# Patient Record
Sex: MALE | Race: OTHER | Employment: FULL TIME | URBAN - METROPOLITAN AREA
[De-identification: names, ages, dates, MRNs, and addresses within clinical notes are randomized per-mention and may not be internally consistent; named-entity substitution may affect disease eponyms.]

---

## 2017-01-24 ENCOUNTER — PATIENT OUTREACH (OUTPATIENT)
Dept: FAMILY MEDICINE CLINIC | Facility: CLINIC | Age: 57
End: 2017-01-24

## 2017-04-07 ENCOUNTER — OFFICE VISIT (OUTPATIENT)
Dept: FAMILY MEDICINE CLINIC | Facility: CLINIC | Age: 57
End: 2017-04-07

## 2017-04-07 VITALS
RESPIRATION RATE: 18 BRPM | TEMPERATURE: 102 F | HEART RATE: 105 BPM | HEIGHT: 72 IN | WEIGHT: 223 LBS | OXYGEN SATURATION: 96 % | DIASTOLIC BLOOD PRESSURE: 76 MMHG | BODY MASS INDEX: 30.2 KG/M2 | SYSTOLIC BLOOD PRESSURE: 118 MMHG

## 2017-04-07 DIAGNOSIS — J11.1 FLU: Primary | ICD-10-CM

## 2017-04-07 PROCEDURE — 99214 OFFICE O/P EST MOD 30 MIN: CPT | Performed by: FAMILY MEDICINE

## 2017-04-07 PROCEDURE — 87502 INFLUENZA DNA AMP PROBE: CPT | Performed by: FAMILY MEDICINE

## 2017-04-07 PROCEDURE — 87798 DETECT AGENT NOS DNA AMP: CPT | Performed by: FAMILY MEDICINE

## 2017-04-07 RX ORDER — OSELTAMIVIR PHOSPHATE 75 MG/1
75 CAPSULE ORAL 2 TIMES DAILY
Qty: 10 CAPSULE | Refills: 0 | Status: SHIPPED | OUTPATIENT
Start: 2017-04-07 | End: 2017-08-03 | Stop reason: ALTCHOICE

## 2017-04-07 RX ORDER — CODEINE PHOSPHATE AND GUAIFENESIN 10; 100 MG/5ML; MG/5ML
5 SOLUTION ORAL 3 TIMES DAILY PRN
Qty: 120 ML | Refills: 0 | Status: SHIPPED
Start: 2017-04-07 | End: 2017-04-17

## 2017-04-07 NOTE — PROGRESS NOTES
Patient presents with:  Cough: RM 16, aches, fever, x 3 days      HPI:   Roseanna Moreno is a 55year old male who presents for upper respiratory symptoms for  2  days. Started suddenly. Moderate, worsening, any activities makes it worse. OTC is not work pains, palpitations. LUNGS: denies shortness of breath with exertion or rest.  CARDIOVASCULAR: denies chest pain on exertion  GI: no nausea or abdominal pain, diarrhea. URO: no decreased urination.       EXAM:   /76 mmHg  Pulse 105  Temp(Src) 102 °F

## 2017-04-10 ENCOUNTER — TELEPHONE (OUTPATIENT)
Dept: FAMILY MEDICINE CLINIC | Facility: CLINIC | Age: 57
End: 2017-04-10

## 2017-04-10 NOTE — TELEPHONE ENCOUNTER
----- Message from Linsey Nava MD sent at 4/8/2017 11:28 AM CDT -----  On call MD notified by lab  Attempted to call patient. No answer.  Did not leave VM because of unidentifiable voice mail  Pls shruti pt and inform of + Influenza B  Pt Rx'd Tamiflu on

## 2017-04-10 NOTE — TELEPHONE ENCOUNTER
I called pt at [phone] and verified 2 patient identifiers: name & . I discussed results and recommendations at length with patient. Pt is taking the Tamiflu. All questions answered.

## 2017-05-30 ENCOUNTER — PATIENT MESSAGE (OUTPATIENT)
Dept: FAMILY MEDICINE CLINIC | Facility: CLINIC | Age: 57
End: 2017-05-30

## 2017-05-30 DIAGNOSIS — L70.9 ACNE, UNSPECIFIED ACNE TYPE: Primary | ICD-10-CM

## 2017-05-30 DIAGNOSIS — L70.9 ACNE, UNSPECIFIED ACNE TYPE: ICD-10-CM

## 2017-05-30 NOTE — TELEPHONE ENCOUNTER
From: Laure Pyle.   To: Andrea Norwood MD  Sent: 5/30/2017 9:55 AM CDT  Subject: Medication Renewal Request    Original authorizing provider: MD Laure Rogers. would like a refill of the following medications:  hydrocor

## 2017-05-30 NOTE — TELEPHONE ENCOUNTER
From: Catrachito Lyn. To: Ander Schultz MD  Sent: 5/30/2017 9:59 AM CDT  Subject: Prescription Question    i am currently using hydrocortisone 2.5% cream for exzyma on my back as well as other areas.  When I have requested a refill of this prescri

## 2017-07-28 ENCOUNTER — HOSPITAL ENCOUNTER (OUTPATIENT)
Dept: CT IMAGING | Facility: HOSPITAL | Age: 57
Discharge: HOME | End: 2017-07-28
Attending: INTERNAL MEDICINE

## 2017-07-28 DIAGNOSIS — Z13.9 VISIT FOR SCREENING: ICD-10-CM

## 2017-08-03 ENCOUNTER — LABORATORY ENCOUNTER (OUTPATIENT)
Dept: LAB | Age: 57
End: 2017-08-03
Attending: EMERGENCY MEDICINE
Payer: COMMERCIAL

## 2017-08-03 ENCOUNTER — OFFICE VISIT (OUTPATIENT)
Dept: FAMILY MEDICINE CLINIC | Facility: CLINIC | Age: 57
End: 2017-08-03

## 2017-08-03 VITALS
HEIGHT: 71 IN | SYSTOLIC BLOOD PRESSURE: 122 MMHG | OXYGEN SATURATION: 98 % | RESPIRATION RATE: 16 BRPM | HEART RATE: 68 BPM | BODY MASS INDEX: 31.08 KG/M2 | WEIGHT: 222 LBS | TEMPERATURE: 98 F | DIASTOLIC BLOOD PRESSURE: 88 MMHG

## 2017-08-03 DIAGNOSIS — Z13.21 ENCOUNTER FOR VITAMIN DEFICIENCY SCREENING: ICD-10-CM

## 2017-08-03 DIAGNOSIS — Z12.5 PROSTATE CANCER SCREENING: ICD-10-CM

## 2017-08-03 DIAGNOSIS — Z13.228 SCREENING FOR ENDOCRINE, NUTRITIONAL, METABOLIC AND IMMUNITY DISORDER: ICD-10-CM

## 2017-08-03 DIAGNOSIS — Z13.29 SCREENING FOR ENDOCRINE, NUTRITIONAL, METABOLIC AND IMMUNITY DISORDER: ICD-10-CM

## 2017-08-03 DIAGNOSIS — Z13.21 SCREENING FOR ENDOCRINE, NUTRITIONAL, METABOLIC AND IMMUNITY DISORDER: ICD-10-CM

## 2017-08-03 DIAGNOSIS — L30.9 ECZEMA, UNSPECIFIED TYPE: ICD-10-CM

## 2017-08-03 DIAGNOSIS — E11.9 TYPE 2 DIABETES MELLITUS WITHOUT COMPLICATION, WITHOUT LONG-TERM CURRENT USE OF INSULIN (HCC): ICD-10-CM

## 2017-08-03 DIAGNOSIS — R91.1 PULMONARY NODULE: ICD-10-CM

## 2017-08-03 DIAGNOSIS — Z13.0 SCREENING FOR ENDOCRINE, NUTRITIONAL, METABOLIC AND IMMUNITY DISORDER: ICD-10-CM

## 2017-08-03 DIAGNOSIS — N52.9 ERECTILE DYSFUNCTION, UNSPECIFIED ERECTILE DYSFUNCTION TYPE: ICD-10-CM

## 2017-08-03 DIAGNOSIS — Z12.5 SCREENING FOR PROSTATE CANCER: ICD-10-CM

## 2017-08-03 DIAGNOSIS — Z00.00 ENCOUNTER FOR ANNUAL PHYSICAL EXAM: Primary | ICD-10-CM

## 2017-08-03 LAB
25-HYDROXYVITAMIN D (TOTAL): 34.3 NG/ML (ref 30–100)
ALBUMIN SERPL-MCNC: 4 G/DL (ref 3.5–4.8)
ALP LIVER SERPL-CCNC: 130 U/L (ref 45–117)
ALT SERPL-CCNC: 43 U/L (ref 17–63)
AST SERPL-CCNC: 33 U/L (ref 15–41)
BASOPHILS # BLD AUTO: 0.04 X10(3) UL (ref 0–0.1)
BASOPHILS NFR BLD AUTO: 0.6 %
BILIRUB SERPL-MCNC: 0.5 MG/DL (ref 0.1–2)
BUN BLD-MCNC: 10 MG/DL (ref 8–20)
CALCIUM BLD-MCNC: 9 MG/DL (ref 8.3–10.3)
CHLORIDE: 106 MMOL/L (ref 101–111)
CHOLEST SMN-MCNC: 190 MG/DL (ref ?–200)
CO2: 28 MMOL/L (ref 22–32)
COMPLEXED PSA SERPL-MCNC: 0.35 NG/ML (ref 0.01–4)
CREAT BLD-MCNC: 0.89 MG/DL (ref 0.7–1.3)
CREAT UR-SCNC: 99 MG/DL
EOSINOPHIL # BLD AUTO: 0.53 X10(3) UL (ref 0–0.3)
EOSINOPHIL NFR BLD AUTO: 8.2 %
ERYTHROCYTE [DISTWIDTH] IN BLOOD BY AUTOMATED COUNT: 13.7 % (ref 11.5–16)
EST. AVERAGE GLUCOSE BLD GHB EST-MCNC: 117 MG/DL (ref 68–126)
GLUCOSE BLD-MCNC: 123 MG/DL (ref 70–99)
HBA1C MFR BLD HPLC: 5.7 % (ref ?–5.7)
HCT VFR BLD AUTO: 45 % (ref 37–53)
HDLC SERPL-MCNC: 44 MG/DL (ref 45–?)
HDLC SERPL: 4.32 {RATIO} (ref ?–4.97)
HGB BLD-MCNC: 15.6 G/DL (ref 13–17)
IMMATURE GRANULOCYTE COUNT: 0.02 X10(3) UL (ref 0–1)
IMMATURE GRANULOCYTE RATIO %: 0.3 %
LDLC SERPL CALC-MCNC: 121 MG/DL (ref ?–130)
LDLC SERPL-MCNC: 25 MG/DL (ref 5–40)
LYMPHOCYTES # BLD AUTO: 2.26 X10(3) UL (ref 0.9–4)
LYMPHOCYTES NFR BLD AUTO: 35.1 %
M PROTEIN MFR SERPL ELPH: 7.8 G/DL (ref 6.1–8.3)
MCH RBC QN AUTO: 33 PG (ref 27–33.2)
MCHC RBC AUTO-ENTMCNC: 34.7 G/DL (ref 31–37)
MCV RBC AUTO: 95.1 FL (ref 80–99)
MICROALBUMIN UR-MCNC: <0.5 MG/DL
MONOCYTES # BLD AUTO: 0.51 X10(3) UL (ref 0.1–0.6)
MONOCYTES NFR BLD AUTO: 7.9 %
NEUTROPHIL ABS PRELIM: 3.08 X10 (3) UL (ref 1.3–6.7)
NEUTROPHILS # BLD AUTO: 3.08 X10(3) UL (ref 1.3–6.7)
NEUTROPHILS NFR BLD AUTO: 47.9 %
NONHDLC SERPL-MCNC: 146 MG/DL (ref ?–130)
PLATELET # BLD AUTO: 208 10(3)UL (ref 150–450)
POTASSIUM SERPL-SCNC: 4.8 MMOL/L (ref 3.6–5.1)
RBC # BLD AUTO: 4.73 X10(6)UL (ref 4.3–5.7)
RED CELL DISTRIBUTION WIDTH-SD: 47.6 FL (ref 35.1–46.3)
SODIUM SERPL-SCNC: 140 MMOL/L (ref 136–144)
TRIGLYCERIDES: 124 MG/DL (ref ?–150)
TSI SER-ACNC: 2.24 MIU/ML (ref 0.35–5.5)
WBC # BLD AUTO: 6.4 X10(3) UL (ref 4–13)

## 2017-08-03 PROCEDURE — 80061 LIPID PANEL: CPT | Performed by: EMERGENCY MEDICINE

## 2017-08-03 PROCEDURE — 84153 ASSAY OF PSA TOTAL: CPT | Performed by: EMERGENCY MEDICINE

## 2017-08-03 PROCEDURE — 82570 ASSAY OF URINE CREATININE: CPT | Performed by: EMERGENCY MEDICINE

## 2017-08-03 PROCEDURE — 83036 HEMOGLOBIN GLYCOSYLATED A1C: CPT | Performed by: EMERGENCY MEDICINE

## 2017-08-03 PROCEDURE — 82306 VITAMIN D 25 HYDROXY: CPT | Performed by: EMERGENCY MEDICINE

## 2017-08-03 PROCEDURE — 36415 COLL VENOUS BLD VENIPUNCTURE: CPT | Performed by: EMERGENCY MEDICINE

## 2017-08-03 PROCEDURE — 99213 OFFICE O/P EST LOW 20 MIN: CPT | Performed by: EMERGENCY MEDICINE

## 2017-08-03 PROCEDURE — 99396 PREV VISIT EST AGE 40-64: CPT | Performed by: EMERGENCY MEDICINE

## 2017-08-03 PROCEDURE — 82043 UR ALBUMIN QUANTITATIVE: CPT | Performed by: EMERGENCY MEDICINE

## 2017-08-03 PROCEDURE — 80050 GENERAL HEALTH PANEL: CPT | Performed by: EMERGENCY MEDICINE

## 2017-08-03 RX ORDER — SILDENAFIL 50 MG/1
TABLET, FILM COATED ORAL
Qty: 20 TABLET | Refills: 0 | Status: SHIPPED | OUTPATIENT
Start: 2017-08-03 | End: 2017-10-13 | Stop reason: ALTCHOICE

## 2017-08-03 NOTE — PATIENT INSTRUCTIONS
Thank you for choosing [facility]  To Do:  FOR ALISTAIR Nieto.  1. Have blood tests done today  2. Follow up in 6 months for recheck DM  3. Arrange for diabetic eye exam with opthalmologist, Dr. Catherine Shelton  4.  Continue with all other meds sandwich  Maldives  · Chicken enchilada, without cheese or sour cream   · Small burrito with whole beans and chicken  · Whole beans (not refried) and rice  · Chicken or fish [facility]  Steakhouse  · Grilled or broiled lean cuts of beef  · Baked potato with ve yogurt  ¨ Meat, poultry, fish, or tofu  ¨ Healthy fats  · Check your blood sugar levels as directed by your provider. Take any medicine as prescribed by your provider. · Learn to read food labels and pick the right portion sizes.   · Eat only the amount of hormone in your body. It lets blood sugar (glucose) reach the cells in your body. All of your cells need glucose for fuel. When you have diabetes, the glucose in your blood builds up because it cannot get into the cells.  This buildup is called high blood lost feeling in your feet, you may not see an injury or infection. Check your feet and between your toes at least once a week. · Wear a medical alert bracelet or necklace, or carry a card in your wallet that says you have diabetes.  This will help [facility] are awake. General care  Always bring a source of fast-acting sugar with you in case you have symptoms of low blood sugar (below 70). At the first sign of low blood sugar, eat or drink 15 to 20 grams of fast-acting sugar to raise your blood sugar.  Example reserved. This information is not intended as a substitute for professional medical care. Always follow your healthcare professional's instructions.

## 2017-08-03 NOTE — PROGRESS NOTES
Chief Complaint:   Patient presents with:  Physical: physical and labs. HPI:   This is a 55year old male who present for a yearly annual exam    WELL-MALE EXAM     1. Age:   55   2. Have you had any of the following problems:         a.  High blood steady your          nerves or get rid of a hangover? NO     7. Prevention:        b.  Exercise:  A lot of walking            Activity:             Days per week:             Time/duration ________ minutes             Exertion:       stroll      mild      h Comment: 6 beer per wk    Family History:  Family History   Problem Relation Age of Onset   • Diabetes Mother        Allergies   No Known Allergies      Current Outpatient Prescriptions on File Prior to Visit:  Fluticasone Propionate 50 MCG/ACT Nasal [place]pe no guarding rigidity no rebound tenderness    ASSESSMENT AND PLAN:         Encounter for annual physical exam  Well balanced diet recommended.    Routine exercise recommended most days during the week.   Wear sunscreen - SPF 15 or higher and reapply every 2 Future

## 2017-08-08 ENCOUNTER — TELEPHONE (OUTPATIENT)
Dept: FAMILY MEDICINE CLINIC | Facility: CLINIC | Age: 57
End: 2017-08-08

## 2017-08-08 DIAGNOSIS — E11.9 TYPE 2 DIABETES MELLITUS WITHOUT COMPLICATION, WITHOUT LONG-TERM CURRENT USE OF INSULIN (HCC): ICD-10-CM

## 2017-08-08 DIAGNOSIS — E78.00 HYPERCHOLESTEREMIA: Primary | ICD-10-CM

## 2017-08-08 RX ORDER — ATORVASTATIN CALCIUM 10 MG/1
10 TABLET, FILM COATED ORAL DAILY
Qty: 90 TABLET | Refills: 0 | Status: SHIPPED | OUTPATIENT
Start: 2017-08-08 | End: 2018-04-09

## 2017-08-08 NOTE — TELEPHONE ENCOUNTER
Atorvastatin to be restarted per dr. Solo Ordonez. Protocol passed, refill approved. Notes Recorded by Corine Franco MD on 8/8/2017 at 6:51 AM CDT  All labs stable.   Continue with Metformin  Pls remind patient to have DM eye exam  Pt needs to be on

## 2017-08-08 NOTE — TELEPHONE ENCOUNTER
Reviewed labs with patient, patient to restart atorvastatin 10 mg daily. Sent rx to pharmacy per protocol. Patient verbalized understanding of labs, will schedule eye exam. Patient has PET scan scheduled for Tuesday-FYI.      Future Appointments  Date Time

## 2017-08-15 ENCOUNTER — HOSPITAL ENCOUNTER (OUTPATIENT)
Dept: NUCLEAR MEDICINE | Facility: HOSPITAL | Age: 57
Discharge: HOME | End: 2017-08-15
Attending: EMERGENCY MEDICINE
Payer: COMMERCIAL

## 2017-08-15 DIAGNOSIS — R91.1 PULMONARY NODULE: ICD-10-CM

## 2017-08-15 LAB — GLUCOSE BLD-MCNC: 126 MG/DL (ref 65–99)

## 2017-08-15 PROCEDURE — 82962 GLUCOSE BLOOD TEST: CPT

## 2017-08-15 PROCEDURE — 78815 PET IMAGE W/CT SKULL-THIGH: CPT | Performed by: EMERGENCY MEDICINE

## 2017-08-17 ENCOUNTER — PATIENT MESSAGE (OUTPATIENT)
Dept: FAMILY MEDICINE CLINIC | Facility: CLINIC | Age: 57
End: 2017-08-17

## 2017-08-17 ENCOUNTER — TELEPHONE (OUTPATIENT)
Dept: FAMILY MEDICINE CLINIC | Facility: CLINIC | Age: 57
End: 2017-08-17

## 2017-08-17 DIAGNOSIS — R91.1 LUNG NODULE: Primary | ICD-10-CM

## 2017-08-18 ENCOUNTER — PATIENT MESSAGE (OUTPATIENT)
Dept: FAMILY MEDICINE CLINIC | Facility: CLINIC | Age: 57
End: 2017-08-18

## 2017-08-18 NOTE — TELEPHONE ENCOUNTER
From: Chary Shaver. To: Mone Coffey MD  Sent: 8/18/2017  9:59 AM CDT  Subject: Test Results Question    my main question, is so what is the nodule? My sister, who is an RN has asked me to ask you about CT guided bioposy. ..      test results f

## 2017-08-18 NOTE — TELEPHONE ENCOUNTER
From: Chary Shaver. To: Mone Coffey MD  Sent: 8/17/2017 2:23 PM CDT  Subject: Test Results Question    Have you gotten the results from my PET CT scan I had earlier this week?

## 2017-08-18 NOTE — TELEPHONE ENCOUNTER
Pt has the below 2 questions after receiving these results notes:     Notes Recorded by Carlo Perry MD on 8/18/2017 at 9:15 AM CDT  No evidence for malignancy based of PET scan  Recommend repeat CT chest in 6 months

## 2017-08-18 NOTE — TELEPHONE ENCOUNTER
Pt sent a Gyst message requesting results as well. Gyst message sent. Released results threw [address] Imaging ordered.

## 2017-08-18 NOTE — TELEPHONE ENCOUNTER
From: Teddy Nava. To: Bryan Duarte MD  Sent: 8/18/2017 9:50 AM CDT  Subject: Test Results Question    test results from PET CT scan on 08.15.17 also state \"enlarged prostrate. [place] Knock [place] Knock \"

## 2017-08-21 ENCOUNTER — PATIENT MESSAGE (OUTPATIENT)
Dept: FAMILY MEDICINE CLINIC | Facility: CLINIC | Age: 57
End: 2017-08-21

## 2017-08-21 ENCOUNTER — TELEPHONE (OUTPATIENT)
Dept: FAMILY MEDICINE CLINIC | Facility: CLINIC | Age: 57
End: 2017-08-21

## 2017-08-21 NOTE — TELEPHONE ENCOUNTER
Preet Jefferson from referral left message for patient stating she was calling to schedule  CT, pt wants to know if Dr Adelbert Skiff ordered this CT, pt unclear if this is correct as he has already had 2 CTs and was unaware that a 3rd was being ordered, please f/u with

## 2017-08-21 NOTE — TELEPHONE ENCOUNTER
From: Keyon Springer.   To: Hector Black MD  Sent: 8/21/2017 2:50 PM CDT  Subject: Visit Follow-up Question    on Friday 08.18 I received a voice message from [facility] [place] from the referral department on behalf of Dr. Sonal Bernabe me of an order f

## 2017-08-25 NOTE — TELEPHONE ENCOUNTER
Pulmonary nodule just needs monitoring for now. Repeat CT in 6 months. No biopsy unless it changes or increases in size    Prostate is enlarged as this happens a lot as men get older. PSA is normal. Will also need monitoring.  Will prostate exam on next phs

## 2017-08-25 NOTE — TELEPHONE ENCOUNTER
Patient notified of below. Advised to call if any questions or concerns. Repeat CT chest is already in system.

## 2017-10-13 ENCOUNTER — OFFICE VISIT (OUTPATIENT)
Dept: FAMILY MEDICINE CLINIC | Facility: CLINIC | Age: 57
End: 2017-10-13

## 2017-10-13 VITALS
DIASTOLIC BLOOD PRESSURE: 80 MMHG | OXYGEN SATURATION: 98 % | SYSTOLIC BLOOD PRESSURE: 118 MMHG | TEMPERATURE: 99 F | WEIGHT: 229 LBS | RESPIRATION RATE: 16 BRPM | HEART RATE: 72 BPM | BODY MASS INDEX: 32 KG/M2

## 2017-10-13 DIAGNOSIS — J02.9 SORE THROAT: Primary | ICD-10-CM

## 2017-10-13 DIAGNOSIS — J06.9 VIRAL URI WITH COUGH: ICD-10-CM

## 2017-10-13 PROCEDURE — 87880 STREP A ASSAY W/OPTIC: CPT | Performed by: NURSE PRACTITIONER

## 2017-10-13 PROCEDURE — 99213 OFFICE O/P EST LOW 20 MIN: CPT | Performed by: NURSE PRACTITIONER

## 2017-10-13 NOTE — PROGRESS NOTES
HPI:   Guadalupe Javier. is a 65year old male who presents with ill symptoms for  2  days.  Patient reports sore throat, congestion, low grade fever, cough with clear/yellow colored sputum, cough is not keeping pt up at night, took Theraflu this morning HEENT: atraumatic, normocephalic,ears full and clear, nares with minimal mucus, throat with no erythema or edema, clear PND noted. No sinus tenderness with palpation.   NECK: supple,tonsillar adenopathy  LUNGS: clear to auscultation all lobes, no wheezes or · Stay away from cigarette smoke - both your smoke and the smoke from others. · You may use over-the-counter acetaminophen or ibuprofen for fever, muscle aching, and headache, unless another medicine was prescribed for this.  If you have chronic liver or k · Continued vomiting (can’t keep liquids down)  · Frequent diarrhea (more than 5 times a day); blood (red or black color) or mucus in diarrhea  · Feeling weak, dizzy, or like you are going to faint  · Extreme thirst  · Fever of 100.4°F (38°C) or higher, or

## 2018-02-05 ENCOUNTER — HOSPITAL ENCOUNTER (OUTPATIENT)
Dept: CT IMAGING | Facility: HOSPITAL | Age: 58
Discharge: HOME | End: 2018-02-05
Attending: EMERGENCY MEDICINE
Payer: COMMERCIAL

## 2018-02-05 DIAGNOSIS — R91.1 LUNG NODULE: ICD-10-CM

## 2018-02-05 PROCEDURE — 71250 CT THORAX DX C-: CPT | Performed by: EMERGENCY MEDICINE

## 2018-02-16 ENCOUNTER — PATIENT OUTREACH (OUTPATIENT)
Dept: FAMILY MEDICINE CLINIC | Facility: CLINIC | Age: 58
End: 2018-02-16

## 2018-02-16 DIAGNOSIS — E11.9 TYPE 2 DIABETES MELLITUS WITHOUT COMPLICATION, WITHOUT LONG-TERM CURRENT USE OF INSULIN (HCC): Primary | ICD-10-CM

## 2018-03-29 ENCOUNTER — NURSE ONLY (OUTPATIENT)
Dept: FAMILY MEDICINE CLINIC | Facility: CLINIC | Age: 58
End: 2018-03-29

## 2018-03-29 ENCOUNTER — APPOINTMENT (OUTPATIENT)
Dept: LAB | Age: 58
End: 2018-03-29
Attending: EMERGENCY MEDICINE
Payer: COMMERCIAL

## 2018-03-29 DIAGNOSIS — E11.9 TYPE 2 DIABETES MELLITUS WITHOUT COMPLICATION, WITHOUT LONG-TERM CURRENT USE OF INSULIN (HCC): ICD-10-CM

## 2018-03-29 DIAGNOSIS — Z23 NEED FOR PNEUMOCOCCAL VACCINATION: Primary | ICD-10-CM

## 2018-03-29 LAB
ALBUMIN SERPL-MCNC: 3.7 G/DL (ref 3.5–4.8)
ALP LIVER SERPL-CCNC: 164 U/L (ref 45–117)
ALT SERPL-CCNC: 45 U/L (ref 17–63)
AST SERPL-CCNC: 33 U/L (ref 15–41)
BILIRUB SERPL-MCNC: 0.4 MG/DL (ref 0.1–2)
BUN BLD-MCNC: 10 MG/DL (ref 8–20)
CALCIUM BLD-MCNC: 8.7 MG/DL (ref 8.3–10.3)
CHLORIDE: 105 MMOL/L (ref 101–111)
CHOLEST SMN-MCNC: 169 MG/DL (ref ?–200)
CO2: 26 MMOL/L (ref 22–32)
CREAT BLD-MCNC: 0.86 MG/DL (ref 0.7–1.3)
CREAT UR-SCNC: 263 MG/DL
EST. AVERAGE GLUCOSE BLD GHB EST-MCNC: 134 MG/DL (ref 68–126)
GLUCOSE BLD-MCNC: 124 MG/DL (ref 70–99)
HBA1C MFR BLD HPLC: 6.3 % (ref ?–5.7)
HDLC SERPL-MCNC: 39 MG/DL (ref 45–?)
HDLC SERPL: 4.33 {RATIO} (ref ?–4.97)
LDLC SERPL CALC-MCNC: 107 MG/DL (ref ?–130)
M PROTEIN MFR SERPL ELPH: 7.9 G/DL (ref 6.1–8.3)
MICROALBUMIN UR-MCNC: 1.27 MG/DL
MICROALBUMIN/CREAT 24H UR-RTO: 4.8 UG/MG (ref ?–30)
NONHDLC SERPL-MCNC: 130 MG/DL (ref ?–130)
POTASSIUM SERPL-SCNC: 4.2 MMOL/L (ref 3.6–5.1)
SODIUM SERPL-SCNC: 139 MMOL/L (ref 136–144)
TRIGL SERPL-MCNC: 114 MG/DL (ref ?–150)
VLDLC SERPL CALC-MCNC: 23 MG/DL (ref 5–40)

## 2018-03-29 PROCEDURE — 90471 IMMUNIZATION ADMIN: CPT | Performed by: EMERGENCY MEDICINE

## 2018-03-29 PROCEDURE — 80061 LIPID PANEL: CPT | Performed by: EMERGENCY MEDICINE

## 2018-03-29 PROCEDURE — 80053 COMPREHEN METABOLIC PANEL: CPT | Performed by: EMERGENCY MEDICINE

## 2018-03-29 PROCEDURE — 83036 HEMOGLOBIN GLYCOSYLATED A1C: CPT | Performed by: EMERGENCY MEDICINE

## 2018-03-29 PROCEDURE — 82570 ASSAY OF URINE CREATININE: CPT | Performed by: EMERGENCY MEDICINE

## 2018-03-29 PROCEDURE — 90732 PPSV23 VACC 2 YRS+ SUBQ/IM: CPT | Performed by: EMERGENCY MEDICINE

## 2018-03-29 PROCEDURE — 82043 UR ALBUMIN QUANTITATIVE: CPT | Performed by: EMERGENCY MEDICINE

## 2018-03-29 PROCEDURE — 36415 COLL VENOUS BLD VENIPUNCTURE: CPT | Performed by: EMERGENCY MEDICINE

## 2018-04-09 ENCOUNTER — OFFICE VISIT (OUTPATIENT)
Dept: FAMILY MEDICINE CLINIC | Facility: CLINIC | Age: 58
End: 2018-04-09

## 2018-04-09 VITALS
RESPIRATION RATE: 16 BRPM | WEIGHT: 227 LBS | DIASTOLIC BLOOD PRESSURE: 84 MMHG | TEMPERATURE: 98 F | OXYGEN SATURATION: 96 % | SYSTOLIC BLOOD PRESSURE: 124 MMHG | HEART RATE: 75 BPM | HEIGHT: 71 IN | BODY MASS INDEX: 31.78 KG/M2

## 2018-04-09 DIAGNOSIS — M25.511 ACUTE PAIN OF RIGHT SHOULDER: ICD-10-CM

## 2018-04-09 DIAGNOSIS — E11.9 TYPE 2 DIABETES MELLITUS WITHOUT COMPLICATION, WITHOUT LONG-TERM CURRENT USE OF INSULIN (HCC): Primary | ICD-10-CM

## 2018-04-09 DIAGNOSIS — M77.11 RIGHT LATERAL EPICONDYLITIS: ICD-10-CM

## 2018-04-09 PROCEDURE — 99214 OFFICE O/P EST MOD 30 MIN: CPT | Performed by: EMERGENCY MEDICINE

## 2018-04-09 RX ORDER — ATORVASTATIN CALCIUM 10 MG/1
10 TABLET, FILM COATED ORAL DAILY
Qty: 90 TABLET | Refills: 0 | Status: SHIPPED | OUTPATIENT
Start: 2018-04-09 | End: 2018-09-10

## 2018-04-09 NOTE — PROGRESS NOTES
CHIEF COMPLAINT   Patient presents with:  Diabetes: F/u         HISTORY OF PRESENT ILLNESS     Heena Gr. is a 65year old year old who presents for recheck of diabetes. Pt has not been checking feet on a regular basis.  Pt denies any tingling Oral Tab, Take 1 tablet (10 mg total) by mouth daily. , Disp: 90 tablet, Rfl: 0  •  hydrocortisone 2.5 % External Cream, Apply 1 Application topically 2 (two) times daily as needed.  Do not use consecutively for more than 10- 14 days, Disp: 453 g, Rfl: 1  • breath and wheezing. Cardiovascular: Negative for chest pain, palpitations and leg swelling. Gastrointestinal: Negative for nausea, vomiting, abdominal pain, diarrhea, blood in stool and abdominal distention.    Genitourinary: Negative for dysuria, hem Comment: 6 beer per wk           PHYSICAL EXAM     /84   Pulse 75   Temp 98.3 °F (36.8 °C) (Oral)   Resp 16   Ht 71\"   Wt 227 lb   SpO2 96%   BMI 31.66 kg/m²   Constitutional: Oriented to person, place, and time. No distress.    HEENT:  Normoce well.  No skin lesions, skin intact      ASSESSMENT AND PLAN       Diagnoses and all orders for this visit:    Type 2 diabetes mellitus without complication, without long-term current use of insulin (HCC)  -     atorvastatin 10 MG Oral Tab;  Take 1 tablet (

## 2018-04-09 NOTE — PATIENT INSTRUCTIONS
Thank you for choosing [address] [place] [address] Group  To Do:  FOR Alfreda JONES 25 for diabetic eye exam, Dr. James Rivera  2. Restart atorvastin and Metformin  3. Repeat labs in 3 months  4. May take OTC Ibuprofen or tylenol as needed for pain  5.  Arra supported. · Put an ice pack on the injured area for 20 minutes every 1 to 2 hours the first day. You can make your own ice pack by putting ice cubes in a plastic bag. Wrap the bag in a thin towel.  Continue with ice packs 3 to 4 times a day for the next 2 professional medical care. Always follow your healthcare professional's instructions. Tennis Elbow  Muscles connect to bones by thick, fibrous cords (tendons).  When the muscles are overused by repeated motion, the tendons may become inflamed and joseph needed. Protect it from movement that causes pain. You may be told to use a forearm splint at night to ease symptoms in the morning. Your healthcare provider may recommend a special wrap or splint to compress the muscles of the forearm.  This can ease pain 5/1/2017  © 4648-8114 [employer]. [address] [facility], [address] [place] [address]. All rights reserved. This information is not intended as a substitute for professional medical care. Always follow your healthcare professional's instructions. may be hard. Your healthcare provider, nurse, diabetes educator, and others can help you. Managing type 2 diabetes means balancing your medicine with diet and activity. Managing your diabetes may also include checking your blood sugar.  And, working with y your blood sugar  Checking your own blood sugar may be a regular part of your care. Or you may only check your blood sugar from time to time. Your healthcare provider will give you instructions about checking your blood sugar at home.  Checking it tells you develop complications from diabetes. Ask your healthcare provider about ways to quit. · Vaccines. Get a yearly flu shot. And, ask your healthcare provider about vaccines to prevent pneumonia, shingles, and hepatitis B.   Stress and depression  Most people

## 2018-04-10 ENCOUNTER — HOSPITAL ENCOUNTER (OUTPATIENT)
Dept: GENERAL RADIOLOGY | Age: 58
Discharge: HOME | End: 2018-04-10
Attending: EMERGENCY MEDICINE
Payer: COMMERCIAL

## 2018-04-10 DIAGNOSIS — M77.11 RIGHT LATERAL EPICONDYLITIS: ICD-10-CM

## 2018-04-10 DIAGNOSIS — M25.511 ACUTE PAIN OF RIGHT SHOULDER: ICD-10-CM

## 2018-04-10 PROCEDURE — 73010 X-RAY EXAM OF SHOULDER BLADE: CPT | Performed by: EMERGENCY MEDICINE

## 2018-04-10 PROCEDURE — 73080 X-RAY EXAM OF ELBOW: CPT | Performed by: EMERGENCY MEDICINE

## 2018-05-03 ENCOUNTER — PATIENT MESSAGE (OUTPATIENT)
Dept: FAMILY MEDICINE CLINIC | Facility: CLINIC | Age: 58
End: 2018-05-03

## 2018-05-03 NOTE — TELEPHONE ENCOUNTER
From: Lily Rogers. To: Tangela Mortensen MD  Sent: 5/3/2018 10:21 AM CDT  Subject: Prescription Question    Jayden Anderson been taking my anti itch medication hydrocortisone for some time now.  I recall taking “Nystatin” previously with more favorable result

## 2018-05-04 RX ORDER — CLOTRIMAZOLE 1 %
CREAM (GRAM) TOPICAL
Qty: 60 G | Refills: 2 | Status: SHIPPED | OUTPATIENT
Start: 2018-05-04 | End: 2018-07-26

## 2018-07-26 RX ORDER — CLOTRIMAZOLE 1 %
CREAM (GRAM) TOPICAL
Qty: 60 G | Refills: 2 | Status: SHIPPED | OUTPATIENT
Start: 2018-07-26 | End: 2018-12-04

## 2018-07-26 NOTE — TELEPHONE ENCOUNTER
From: Iline Saint.   Sent: 7/26/2018 1:23 PM CDT  Subject: Medication Renewal Request    Iline Saint. would like a refill of the following medications:     clotrimazole 1 % External Cream Wen Harris MD]    [facility]

## 2018-09-08 ENCOUNTER — APPOINTMENT (OUTPATIENT)
Dept: LAB | Age: 58
End: 2018-09-08
Attending: EMERGENCY MEDICINE
Payer: COMMERCIAL

## 2018-09-08 DIAGNOSIS — Z13.21 SCREENING FOR ENDOCRINE, NUTRITIONAL, METABOLIC AND IMMUNITY DISORDER: ICD-10-CM

## 2018-09-08 DIAGNOSIS — Z13.228 SCREENING FOR ENDOCRINE, NUTRITIONAL, METABOLIC AND IMMUNITY DISORDER: ICD-10-CM

## 2018-09-08 DIAGNOSIS — Z13.0 SCREENING FOR ENDOCRINE, NUTRITIONAL, METABOLIC AND IMMUNITY DISORDER: ICD-10-CM

## 2018-09-08 DIAGNOSIS — Z13.29 SCREENING FOR ENDOCRINE, NUTRITIONAL, METABOLIC AND IMMUNITY DISORDER: ICD-10-CM

## 2018-09-08 DIAGNOSIS — E11.9 TYPE 2 DIABETES MELLITUS WITHOUT COMPLICATION, WITHOUT LONG-TERM CURRENT USE OF INSULIN (HCC): ICD-10-CM

## 2018-09-08 LAB
ALBUMIN SERPL-MCNC: 3.8 G/DL (ref 3.5–4.8)
ALBUMIN/GLOB SERPL: 1 {RATIO} (ref 1–2)
ALP LIVER SERPL-CCNC: 145 U/L (ref 45–117)
ALT SERPL-CCNC: 50 U/L (ref 17–63)
ANION GAP SERPL CALC-SCNC: 8 MMOL/L (ref 0–18)
AST SERPL-CCNC: 47 U/L (ref 15–41)
BILIRUB SERPL-MCNC: 0.4 MG/DL (ref 0.1–2)
BUN BLD-MCNC: 8 MG/DL (ref 8–20)
BUN/CREAT SERPL: 8.4 (ref 10–20)
CALCIUM BLD-MCNC: 8.8 MG/DL (ref 8.3–10.3)
CHLORIDE SERPL-SCNC: 107 MMOL/L (ref 101–111)
CHOLEST SMN-MCNC: 107 MG/DL (ref ?–200)
CO2 SERPL-SCNC: 25 MMOL/L (ref 22–32)
CREAT BLD-MCNC: 0.95 MG/DL (ref 0.7–1.3)
EST. AVERAGE GLUCOSE BLD GHB EST-MCNC: 143 MG/DL (ref 68–126)
GLOBULIN PLAS-MCNC: 3.7 G/DL (ref 2.5–4)
GLUCOSE BLD-MCNC: 123 MG/DL (ref 70–99)
HBA1C MFR BLD HPLC: 6.6 % (ref ?–5.7)
HDLC SERPL-MCNC: 39 MG/DL (ref 40–59)
LDLC SERPL CALC-MCNC: 53 MG/DL (ref ?–100)
M PROTEIN MFR SERPL ELPH: 7.5 G/DL (ref 6.1–8.3)
NONHDLC SERPL-MCNC: 68 MG/DL (ref ?–130)
OSMOLALITY SERPL CALC.SUM OF ELEC: 290 MOSM/KG (ref 275–295)
POTASSIUM SERPL-SCNC: 4 MMOL/L (ref 3.6–5.1)
SODIUM SERPL-SCNC: 140 MMOL/L (ref 136–144)
TRIGL SERPL-MCNC: 75 MG/DL (ref 30–149)
VLDLC SERPL CALC-MCNC: 15 MG/DL (ref 0–30)

## 2018-09-08 PROCEDURE — 80053 COMPREHEN METABOLIC PANEL: CPT | Performed by: EMERGENCY MEDICINE

## 2018-09-08 PROCEDURE — 36415 COLL VENOUS BLD VENIPUNCTURE: CPT | Performed by: EMERGENCY MEDICINE

## 2018-09-08 PROCEDURE — 83036 HEMOGLOBIN GLYCOSYLATED A1C: CPT | Performed by: EMERGENCY MEDICINE

## 2018-09-08 PROCEDURE — 84443 ASSAY THYROID STIM HORMONE: CPT | Performed by: EMERGENCY MEDICINE

## 2018-09-08 PROCEDURE — 80061 LIPID PANEL: CPT | Performed by: EMERGENCY MEDICINE

## 2018-09-10 ENCOUNTER — OFFICE VISIT (OUTPATIENT)
Dept: FAMILY MEDICINE CLINIC | Facility: CLINIC | Age: 58
End: 2018-09-10
Payer: COMMERCIAL

## 2018-09-10 VITALS
SYSTOLIC BLOOD PRESSURE: 122 MMHG | WEIGHT: 227 LBS | BODY MASS INDEX: 31.78 KG/M2 | RESPIRATION RATE: 17 BRPM | TEMPERATURE: 98 F | HEIGHT: 71 IN | OXYGEN SATURATION: 97 % | DIASTOLIC BLOOD PRESSURE: 84 MMHG | HEART RATE: 68 BPM

## 2018-09-10 DIAGNOSIS — E78.00 HYPERCHOLESTEREMIA: ICD-10-CM

## 2018-09-10 DIAGNOSIS — Z13.228 SCREENING FOR ENDOCRINE, NUTRITIONAL, METABOLIC AND IMMUNITY DISORDER: ICD-10-CM

## 2018-09-10 DIAGNOSIS — E11.9 TYPE 2 DIABETES MELLITUS WITHOUT COMPLICATION, WITHOUT LONG-TERM CURRENT USE OF INSULIN (HCC): ICD-10-CM

## 2018-09-10 DIAGNOSIS — Z12.5 PROSTATE CANCER SCREENING: ICD-10-CM

## 2018-09-10 DIAGNOSIS — Z00.00 ENCOUNTER FOR ANNUAL PHYSICAL EXAM: Primary | ICD-10-CM

## 2018-09-10 DIAGNOSIS — Z13.29 SCREENING FOR ENDOCRINE, NUTRITIONAL, METABOLIC AND IMMUNITY DISORDER: ICD-10-CM

## 2018-09-10 DIAGNOSIS — Z23 NEED FOR INFLUENZA VACCINATION: ICD-10-CM

## 2018-09-10 DIAGNOSIS — Z13.0 SCREENING FOR ENDOCRINE, NUTRITIONAL, METABOLIC AND IMMUNITY DISORDER: ICD-10-CM

## 2018-09-10 DIAGNOSIS — Z13.21 SCREENING FOR ENDOCRINE, NUTRITIONAL, METABOLIC AND IMMUNITY DISORDER: ICD-10-CM

## 2018-09-10 LAB — TSI SER-ACNC: 1.9 MIU/ML (ref 0.35–5.5)

## 2018-09-10 PROCEDURE — 99396 PREV VISIT EST AGE 40-64: CPT | Performed by: EMERGENCY MEDICINE

## 2018-09-10 PROCEDURE — 90471 IMMUNIZATION ADMIN: CPT | Performed by: EMERGENCY MEDICINE

## 2018-09-10 PROCEDURE — 90686 IIV4 VACC NO PRSV 0.5 ML IM: CPT | Performed by: EMERGENCY MEDICINE

## 2018-09-10 RX ORDER — AMOXICILLIN 500 MG/1
CAPSULE ORAL
COMMUNITY
Start: 2018-09-04 | End: 2021-04-01 | Stop reason: ALTCHOICE

## 2018-09-10 RX ORDER — ATORVASTATIN CALCIUM 10 MG/1
10 TABLET, FILM COATED ORAL DAILY
Qty: 90 TABLET | Refills: 1 | Status: SHIPPED | OUTPATIENT
Start: 2018-09-10 | End: 2019-05-07

## 2018-09-10 NOTE — PATIENT INSTRUCTIONS
Thank you for choosing [facility]  To Do:  FOR ALISTAIR Ye.     · Follow up in 6 months, have blood tests done before visit  · Need to lose weight in order to achieve a healthy BMI  · Continue with Metformin and Lipitor  · Arrange for DM ey

## 2018-09-10 NOTE — PROGRESS NOTES
Chief Complaint:   Patient presents with:  Physical: Annual physical     HPI:   This is a 65year old male who present for a yearly annual exam    WELL-MALE EXAM     1. Age:   65   2. Have you had any of the following problems:         a.  High blood pr COLONOSCOPY      Comment:  wnl  Social History:  Social History    Tobacco Use      Smoking status: Never Smoker      Smokeless tobacco: Never Used    Alcohol use: Yes      Comment: 6 beer per wk    Drug use: No    Family History:  Family History   Problem 09/08/18  9:25 AM   Result Value Ref Range    Cholesterol, Total 107 <200 mg/dL    HDL Cholesterol 39 (L) 40 - 59 mg/dL    Triglycerides 75 30 - 149 mg/dL    LDL Cholesterol 53 <100 mg/dL    VLDL 15 0 - 30 mg/dL    Non HDL Chol 68 <130 mg/dL   COMP METABOL any further concerns. Type 2 diabetes mellitus without complication, without long-term current use of insulin (HCC)  Stable encouraged to take metformin regularly. -     MetFORMIN HCl 500 MG Oral Tab;  Take 1 tablet (500 mg total) by mouth daily with br

## 2018-10-16 ENCOUNTER — TELEPHONE (OUTPATIENT)
Dept: FAMILY MEDICINE CLINIC | Facility: CLINIC | Age: 58
End: 2018-10-16

## 2018-10-16 NOTE — TELEPHONE ENCOUNTER
Left patient a message stating that he is behind on his eye exams and if he has had it done to please call the office if not there is a referral in and gave him information.

## 2018-12-04 DIAGNOSIS — L30.9 ECZEMA, UNSPECIFIED TYPE: ICD-10-CM

## 2018-12-04 RX ORDER — CLOTRIMAZOLE 1 %
CREAM (GRAM) TOPICAL
Qty: 60 G | Refills: 2 | Status: SHIPPED | OUTPATIENT
Start: 2018-12-04 | End: 2021-04-01 | Stop reason: ALTCHOICE

## 2018-12-04 NOTE — TELEPHONE ENCOUNTER
Medication(s) to Refill:   Requested Prescriptions     Pending Prescriptions Disp Refills   • hydrocortisone 2.5 % External Cream 453 g 1     Sig: Apply 1 Application topically 2 (two) times daily as needed.  Do not use consecutively for more than 10- 14 da

## 2019-05-07 DIAGNOSIS — E11.9 TYPE 2 DIABETES MELLITUS WITHOUT COMPLICATION, WITHOUT LONG-TERM CURRENT USE OF INSULIN (HCC): ICD-10-CM

## 2019-05-07 RX ORDER — ATORVASTATIN CALCIUM 10 MG/1
TABLET, FILM COATED ORAL
Qty: 90 TABLET | Refills: 1 | Status: SHIPPED | OUTPATIENT
Start: 2019-05-07 | End: 2021-04-01

## 2019-05-07 NOTE — TELEPHONE ENCOUNTER
Medication(s) to Refill:   Requested Prescriptions     Pending Prescriptions Disp Refills   • METFORMIN  MG Oral Tab [Pharmacy Med Name: METFORMIN 500MG TAB] 90 tablet 1     Sig: TAKE 1 TABLET BY MOUTH ONCE DAILY WITH BREAKFAST     Signed [facility]

## 2019-05-10 NOTE — TELEPHONE ENCOUNTER
Patient is due for OV. Please contact patient and schedule OV. Patient is also due for eye exam. If patient needs a referral I can put it in.

## 2019-05-10 NOTE — TELEPHONE ENCOUNTER
Spoke to Patient, notified of below information, states that he is moving to [place] in the near future and will be transferring his medical care there

## 2019-11-22 ENCOUNTER — TELEPHONE (OUTPATIENT)
Dept: FAMILY MEDICINE CLINIC | Facility: CLINIC | Age: 59
End: 2019-11-22

## 2021-03-01 ENCOUNTER — MED REC SCAN ONLY (OUTPATIENT)
Dept: FAMILY MEDICINE CLINIC | Facility: CLINIC | Age: 61
End: 2021-03-01

## 2021-03-01 ENCOUNTER — TELEPHONE (OUTPATIENT)
Dept: FAMILY MEDICINE CLINIC | Facility: CLINIC | Age: 61
End: 2021-03-01

## 2021-04-01 ENCOUNTER — OFFICE VISIT (OUTPATIENT)
Dept: FAMILY MEDICINE CLINIC | Facility: CLINIC | Age: 61
End: 2021-04-01
Payer: COMMERCIAL

## 2021-04-01 ENCOUNTER — LAB ENCOUNTER (OUTPATIENT)
Dept: LAB | Age: 61
End: 2021-04-01
Attending: EMERGENCY MEDICINE
Payer: COMMERCIAL

## 2021-04-01 VITALS
RESPIRATION RATE: 15 BRPM | TEMPERATURE: 97 F | WEIGHT: 228 LBS | SYSTOLIC BLOOD PRESSURE: 110 MMHG | HEIGHT: 71 IN | OXYGEN SATURATION: 99 % | BODY MASS INDEX: 31.92 KG/M2 | DIASTOLIC BLOOD PRESSURE: 72 MMHG | HEART RATE: 75 BPM

## 2021-04-01 DIAGNOSIS — Z13.0 SCREENING FOR ENDOCRINE, NUTRITIONAL, METABOLIC AND IMMUNITY DISORDER: ICD-10-CM

## 2021-04-01 DIAGNOSIS — Z13.29 SCREENING FOR ENDOCRINE, NUTRITIONAL, METABOLIC AND IMMUNITY DISORDER: ICD-10-CM

## 2021-04-01 DIAGNOSIS — E11.9 TYPE 2 DIABETES MELLITUS WITHOUT COMPLICATION, WITHOUT LONG-TERM CURRENT USE OF INSULIN (HCC): Primary | ICD-10-CM

## 2021-04-01 DIAGNOSIS — Z12.5 SCREENING FOR PROSTATE CANCER: ICD-10-CM

## 2021-04-01 DIAGNOSIS — E11.9 TYPE 2 DIABETES MELLITUS WITHOUT COMPLICATION, WITHOUT LONG-TERM CURRENT USE OF INSULIN (HCC): ICD-10-CM

## 2021-04-01 DIAGNOSIS — I15.2 HYPERTENSION ASSOCIATED WITH DIABETES (HCC): ICD-10-CM

## 2021-04-01 DIAGNOSIS — E11.59 HYPERTENSION ASSOCIATED WITH DIABETES (HCC): ICD-10-CM

## 2021-04-01 DIAGNOSIS — N40.0 BENIGN PROSTATIC HYPERPLASIA WITHOUT LOWER URINARY TRACT SYMPTOMS: ICD-10-CM

## 2021-04-01 DIAGNOSIS — Z80.42 FAMILY HISTORY OF PROSTATE CANCER: ICD-10-CM

## 2021-04-01 DIAGNOSIS — Z13.21 SCREENING FOR ENDOCRINE, NUTRITIONAL, METABOLIC AND IMMUNITY DISORDER: ICD-10-CM

## 2021-04-01 DIAGNOSIS — Z13.228 SCREENING FOR ENDOCRINE, NUTRITIONAL, METABOLIC AND IMMUNITY DISORDER: ICD-10-CM

## 2021-04-01 DIAGNOSIS — R91.1 PULMONARY NODULE: ICD-10-CM

## 2021-04-01 PROCEDURE — 3078F DIAST BP <80 MM HG: CPT | Performed by: EMERGENCY MEDICINE

## 2021-04-01 PROCEDURE — 3008F BODY MASS INDEX DOCD: CPT | Performed by: EMERGENCY MEDICINE

## 2021-04-01 PROCEDURE — 3074F SYST BP LT 130 MM HG: CPT | Performed by: EMERGENCY MEDICINE

## 2021-04-01 PROCEDURE — 80050 GENERAL HEALTH PANEL: CPT | Performed by: EMERGENCY MEDICINE

## 2021-04-01 PROCEDURE — 83036 HEMOGLOBIN GLYCOSYLATED A1C: CPT | Performed by: EMERGENCY MEDICINE

## 2021-04-01 PROCEDURE — 84153 ASSAY OF PSA TOTAL: CPT | Performed by: EMERGENCY MEDICINE

## 2021-04-01 PROCEDURE — 82043 UR ALBUMIN QUANTITATIVE: CPT | Performed by: EMERGENCY MEDICINE

## 2021-04-01 PROCEDURE — 82570 ASSAY OF URINE CREATININE: CPT | Performed by: EMERGENCY MEDICINE

## 2021-04-01 PROCEDURE — 80061 LIPID PANEL: CPT | Performed by: EMERGENCY MEDICINE

## 2021-04-01 PROCEDURE — 99214 OFFICE O/P EST MOD 30 MIN: CPT | Performed by: EMERGENCY MEDICINE

## 2021-04-01 PROCEDURE — 36415 COLL VENOUS BLD VENIPUNCTURE: CPT | Performed by: EMERGENCY MEDICINE

## 2021-04-01 PROCEDURE — 3061F NEG MICROALBUMINURIA REV: CPT | Performed by: EMERGENCY MEDICINE

## 2021-04-01 RX ORDER — TAMSULOSIN HYDROCHLORIDE 0.4 MG/1
0.4 CAPSULE ORAL DAILY
Qty: 90 CAPSULE | Refills: 1 | Status: SHIPPED | OUTPATIENT
Start: 2021-04-01 | End: 2021-10-21

## 2021-04-01 RX ORDER — TAMSULOSIN HYDROCHLORIDE 0.4 MG/1
CAPSULE ORAL
COMMUNITY
Start: 2020-12-01 | End: 2021-04-01

## 2021-04-01 RX ORDER — ATORVASTATIN CALCIUM 10 MG/1
10 TABLET, FILM COATED ORAL DAILY
Qty: 90 TABLET | Refills: 1 | Status: SHIPPED | OUTPATIENT
Start: 2021-04-01 | End: 2021-10-21

## 2021-04-01 RX ORDER — LISINOPRIL 2.5 MG/1
2.5 TABLET ORAL DAILY
Qty: 90 TABLET | Refills: 1 | Status: SHIPPED | OUTPATIENT
Start: 2021-04-01 | End: 2021-10-21

## 2021-04-01 RX ORDER — LISINOPRIL 2.5 MG/1
TABLET ORAL
COMMUNITY
Start: 2020-12-01 | End: 2021-04-01

## 2021-04-01 NOTE — PROGRESS NOTES
Chief Complaint:   Patient presents with:  Pre-diabetes: F/u     HPI:   This is a 62year old male       DIABETES  Metformin increased to 500 BID  No blood sugar tests  Takeing medication more regularly      ETOH: has cut back considerably.     HYPERTENSI prostate cancer     Hypertension associated with diabetes ([facility] [address]        PHYSICAL EXAM:   /72   Pulse 75   Temp 96.8 °F (36 °C) (Temporal)   Resp 15   Ht 5' 11\" (1.803 m)   Wt 228 lb (103.4 kg)   SpO2 99%   BMI 31.80 kg/m²  Estimated body mass index i Future    4. Screening for endocrine, nutritional, metabolic and immunity disorder  - CBC WITH DIFFERENTIAL WITH PLATELET; Future  - TSH W REFLEX TO FREE T4; Future    5.  Benign prostatic hyperplasia without lower urinary tract symptoms  - tamsulosin (FLOM

## 2021-04-01 NOTE — PATIENT INSTRUCTIONS
Thank you for choosing [facility]  To Do:  FOR ALISTAIR Vaughn.        1. Have blood tests done  2. Continue with all medications  3. Avoid weight gain  4. Need to lose weight,  5.  Overall decreased caloric intake in order to promote weight lo cereals and yogurt to candy and desserts. Sugars raise blood sugar. · Starches. These are in bread, cereals, pasta, and dried beans. They’re found in corn, peas, potatoes, yam, acorn squash, and butternut squash.  Starches raise blood sugar.   · Fiber. Thi cholesterol and lower HDL (\"good\") cholesterol. They are not healthy for your heart. Protein  Protein helps the body build and repair muscle and other tissue. Protein has little or no effect on blood sugar.  But many foods that have protein also have sat similar to the numbers listed on your daily glucose monitor. Both A1C and eAG measure the amount of glucose stuck to a protein called hemoglobin in red blood cells. Your healthcare provider will help you figure out what your ideal A1C or eAG should be.  You sugar. It does so by releasing more glucose into the blood. Alcohol in the blood stops the liver from doing this. Low blood sugar is more likely if you drink alcohol on an empty stomach. Or if you have alcohol during or right after exercise.  It's also more you count carbs, don't count calories from alcohol. It's not a substitute for healthy food.   · Ask your provider how alcohol affects insulin or any medicines you take. · Don't drink on an empty stomach. · Don't drink during or after exercise.   · Don't d

## 2021-04-05 ENCOUNTER — MED REC SCAN ONLY (OUTPATIENT)
Dept: FAMILY MEDICINE CLINIC | Facility: CLINIC | Age: 61
End: 2021-04-05

## 2021-04-29 ENCOUNTER — TELEPHONE (OUTPATIENT)
Dept: FAMILY MEDICINE CLINIC | Facility: CLINIC | Age: 61
End: 2021-04-29

## 2021-08-10 ENCOUNTER — TELEPHONE (OUTPATIENT)
Dept: FAMILY MEDICINE CLINIC | Facility: CLINIC | Age: 61
End: 2021-08-10

## 2021-08-10 DIAGNOSIS — I15.2 HYPERTENSION ASSOCIATED WITH DIABETES (HCC): ICD-10-CM

## 2021-08-10 DIAGNOSIS — E11.59 HYPERTENSION ASSOCIATED WITH DIABETES (HCC): ICD-10-CM

## 2021-08-10 RX ORDER — LISINOPRIL 2.5 MG/1
2.5 TABLET ORAL DAILY
Qty: 90 TABLET | Refills: 1 | OUTPATIENT
Start: 2021-08-10

## 2021-10-12 ENCOUNTER — MED REC SCAN ONLY (OUTPATIENT)
Dept: FAMILY MEDICINE CLINIC | Facility: CLINIC | Age: 61
End: 2021-10-12

## 2021-10-12 ENCOUNTER — TELEPHONE (OUTPATIENT)
Dept: FAMILY MEDICINE CLINIC | Facility: CLINIC | Age: 61
End: 2021-10-12

## 2021-10-12 DIAGNOSIS — R91.1 PULMONARY NODULE: Primary | ICD-10-CM

## 2021-10-12 NOTE — TELEPHONE ENCOUNTER
CT of the chest done at outside hospital in 2020 reviewed. Positive pulmonary nodule in the right upper lobe measuring 9 mm.     Patient due for repeat imaging    PET/CT ordered, pls inform patient

## 2021-10-21 DIAGNOSIS — N40.0 BENIGN PROSTATIC HYPERPLASIA WITHOUT LOWER URINARY TRACT SYMPTOMS: ICD-10-CM

## 2021-10-21 DIAGNOSIS — E11.9 TYPE 2 DIABETES MELLITUS WITHOUT COMPLICATION, WITHOUT LONG-TERM CURRENT USE OF INSULIN (HCC): ICD-10-CM

## 2021-10-21 DIAGNOSIS — I15.2 HYPERTENSION ASSOCIATED WITH DIABETES (HCC): ICD-10-CM

## 2021-10-21 DIAGNOSIS — E11.59 HYPERTENSION ASSOCIATED WITH DIABETES (HCC): ICD-10-CM

## 2021-10-21 RX ORDER — ATORVASTATIN CALCIUM 10 MG/1
10 TABLET, FILM COATED ORAL DAILY
Qty: 30 TABLET | Refills: 0 | Status: SHIPPED | OUTPATIENT
Start: 2021-10-21 | End: 2021-11-23

## 2021-10-21 RX ORDER — LISINOPRIL 2.5 MG/1
2.5 TABLET ORAL DAILY
Qty: 30 TABLET | Refills: 0 | Status: SHIPPED | OUTPATIENT
Start: 2021-10-21 | End: 2021-11-23

## 2021-10-21 RX ORDER — TAMSULOSIN HYDROCHLORIDE 0.4 MG/1
0.4 CAPSULE ORAL DAILY
Qty: 30 CAPSULE | Refills: 0 | Status: SHIPPED | OUTPATIENT
Start: 2021-10-21 | End: 2021-11-23

## 2021-11-06 ENCOUNTER — LABORATORY ENCOUNTER (OUTPATIENT)
Dept: LAB | Age: 61
End: 2021-11-06
Attending: EMERGENCY MEDICINE
Payer: COMMERCIAL

## 2021-11-06 DIAGNOSIS — E11.9 TYPE 2 DIABETES MELLITUS WITHOUT COMPLICATION, WITHOUT LONG-TERM CURRENT USE OF INSULIN (HCC): ICD-10-CM

## 2021-11-06 DIAGNOSIS — R74.8 ELEVATED LIVER ENZYMES: ICD-10-CM

## 2021-11-06 PROCEDURE — 80053 COMPREHEN METABOLIC PANEL: CPT | Performed by: EMERGENCY MEDICINE

## 2021-11-06 PROCEDURE — 83036 HEMOGLOBIN GLYCOSYLATED A1C: CPT | Performed by: EMERGENCY MEDICINE

## 2021-11-06 PROCEDURE — 82390 ASSAY OF CERULOPLASMIN: CPT | Performed by: EMERGENCY MEDICINE

## 2021-11-06 PROCEDURE — 80074 ACUTE HEPATITIS PANEL: CPT | Performed by: EMERGENCY MEDICINE

## 2021-11-06 PROCEDURE — 82728 ASSAY OF FERRITIN: CPT | Performed by: EMERGENCY MEDICINE

## 2021-11-06 PROCEDURE — 80061 LIPID PANEL: CPT | Performed by: EMERGENCY MEDICINE

## 2021-11-06 PROCEDURE — 3044F HG A1C LEVEL LT 7.0%: CPT | Performed by: EMERGENCY MEDICINE

## 2021-11-13 ENCOUNTER — PATIENT MESSAGE (OUTPATIENT)
Dept: FAMILY MEDICINE CLINIC | Facility: CLINIC | Age: 61
End: 2021-11-13

## 2021-11-15 NOTE — TELEPHONE ENCOUNTER
From: Lilly Quintanilla.   To: Luis A Elizondo MD  Sent: 11/13/2021 4:56 PM CST  Subject: Vaccinations    Leotha Shillings booster administered by [facility] in [facility] 11.11.2021    Flu vaccination administered by [facility] in [facility] 11.11.2021

## 2021-11-18 ENCOUNTER — OFFICE VISIT (OUTPATIENT)
Dept: FAMILY MEDICINE CLINIC | Facility: CLINIC | Age: 61
End: 2021-11-18
Payer: COMMERCIAL

## 2021-11-18 VITALS
HEART RATE: 70 BPM | HEIGHT: 71 IN | RESPIRATION RATE: 16 BRPM | WEIGHT: 227 LBS | BODY MASS INDEX: 31.78 KG/M2 | OXYGEN SATURATION: 99 % | SYSTOLIC BLOOD PRESSURE: 124 MMHG | DIASTOLIC BLOOD PRESSURE: 82 MMHG

## 2021-11-18 DIAGNOSIS — E11.9 TYPE 2 DIABETES MELLITUS WITHOUT COMPLICATION, WITHOUT LONG-TERM CURRENT USE OF INSULIN (HCC): ICD-10-CM

## 2021-11-18 DIAGNOSIS — R91.1 PULMONARY NODULE: ICD-10-CM

## 2021-11-18 DIAGNOSIS — Z00.00 ENCOUNTER FOR ANNUAL PHYSICAL EXAM: Primary | ICD-10-CM

## 2021-11-18 DIAGNOSIS — R74.8 ELEVATED LIVER ENZYMES: ICD-10-CM

## 2021-11-18 DIAGNOSIS — Z80.42 FAMILY HISTORY OF PROSTATE CANCER: ICD-10-CM

## 2021-11-18 DIAGNOSIS — N40.0 BENIGN PROSTATIC HYPERPLASIA WITHOUT LOWER URINARY TRACT SYMPTOMS: ICD-10-CM

## 2021-11-18 DIAGNOSIS — E78.00 HYPERCHOLESTEREMIA: ICD-10-CM

## 2021-11-18 PROCEDURE — 3008F BODY MASS INDEX DOCD: CPT | Performed by: EMERGENCY MEDICINE

## 2021-11-18 PROCEDURE — 99212 OFFICE O/P EST SF 10 MIN: CPT | Performed by: EMERGENCY MEDICINE

## 2021-11-18 PROCEDURE — 99396 PREV VISIT EST AGE 40-64: CPT | Performed by: EMERGENCY MEDICINE

## 2021-11-18 PROCEDURE — 3074F SYST BP LT 130 MM HG: CPT | Performed by: EMERGENCY MEDICINE

## 2021-11-18 PROCEDURE — 3079F DIAST BP 80-89 MM HG: CPT | Performed by: EMERGENCY MEDICINE

## 2021-11-18 NOTE — PATIENT INSTRUCTIONS
Thank you for choosing [facility]  To Do:  FOR [address] [place].        1. Recommend meeting with diabetic educator  2. Recommend regular exercises  3. Ok to increase Metformin 1000 mg in am and 500 in PM  4. Follow up in 3 months  5.  Daily blo systolic blood pressure is 120 to 139 mm Hg, or your diastolic blood pressure reading is 80 to 89 mm Hg   Colorectal cancer All men in this age group Flexible sigmoidoscopy every 5 years, or colonoscopy every 10 years, or double-contrast barium enema every should be given at least 4 weeks after the first dose   Hepatitis A Men at increased risk for infection – talk with your healthcare provider 2 doses given at least 6 months apart   Hepatitis B Men at increased risk for infection – talk with your healthcare age group Every visit   [address] [place] [address] [facility]  Date Last Reviewed: 2/1/2017  © 4965-5110 [employer]. [address] [facility], [address] [place] [address]. All rights reserved.  This information is not intended as a substitute for yelena leaves your body. · Prostate ultrasound. This test uses sound waves to view the size and inside of the prostate gland. Treating BPH  If you have mild symptoms, you may not need treatment. You may be able to control your BPH with lifestyle changes.  Some m Certain tests help tell BPH from prostate cancer. They include prostate ultrasound and biopsy. Linette last reviewed this educational content on 9/1/2019  © 0123-0169 [employer]. All rights reserved.  This information is not intended as a s

## 2021-11-18 NOTE — PROGRESS NOTES
Chief Complaint:   Patient presents with:  Physical: Annual w/labs     HPI:   This is a 62year old male who present for a yearly annual exam    WELL-MALE EXAM         1. Age:              62   2.   Have you had any of the following problems:          a. History:   Diagnosis Date   • Arthritis     RT shoulder   • Diabetes type 2, controlled ([facility] [address]    • Elevated liver enzymes    • Fatty liver    • Hypercholesterolemia      Past Surgical History:   Procedure Laterality Date   • COLONOSCOPY  1 1/2 ago    wnl murmur  EXTREMITIES: no cyanosis, clubbing or edema  GI:soft Nontender Normoactive BS. No palpable masses no guarding rigidity no rebound tenderness        SABRINA: Good sphincter tone. s no rectal masses palpated.   Prostate does slightly enlarged smooth with for elevated liver enzyes, Dr. Vickey Hernandez  8. Continue with tamsulosin  9. Follow up with Urology for prostate, Dr. Judosn Zelaya  10. Arrange for PET scan for lung nodules      Well Man Exam  Well balanced diet recommended.     Routine exercise recommended most days

## 2021-11-23 DIAGNOSIS — E11.59 HYPERTENSION ASSOCIATED WITH DIABETES (HCC): ICD-10-CM

## 2021-11-23 DIAGNOSIS — N40.0 BENIGN PROSTATIC HYPERPLASIA WITHOUT LOWER URINARY TRACT SYMPTOMS: ICD-10-CM

## 2021-11-23 DIAGNOSIS — E11.9 TYPE 2 DIABETES MELLITUS WITHOUT COMPLICATION, WITHOUT LONG-TERM CURRENT USE OF INSULIN (HCC): ICD-10-CM

## 2021-11-23 DIAGNOSIS — I15.2 HYPERTENSION ASSOCIATED WITH DIABETES (HCC): ICD-10-CM

## 2021-11-23 NOTE — TELEPHONE ENCOUNTER
Medication(s) to Refill:   Requested Prescriptions     Pending Prescriptions Disp Refills   • tamsulosin (FLOMAX) cap 30 capsule 0     Sig: Take 1 capsule (0.4 mg total) by mouth daily.    • lisinopril 2.5 MG Oral Tab 30 tablet 0     Sig: Take 1 tablet (2.5

## 2021-11-24 RX ORDER — LISINOPRIL 2.5 MG/1
2.5 TABLET ORAL DAILY
Qty: 90 TABLET | Refills: 1 | Status: SHIPPED | OUTPATIENT
Start: 2021-11-24

## 2021-11-24 RX ORDER — ATORVASTATIN CALCIUM 10 MG/1
10 TABLET, FILM COATED ORAL DAILY
Qty: 90 TABLET | Refills: 1 | Status: SHIPPED | OUTPATIENT
Start: 2021-11-24

## 2021-11-24 RX ORDER — TAMSULOSIN HYDROCHLORIDE 0.4 MG/1
0.4 CAPSULE ORAL DAILY
Qty: 90 CAPSULE | Refills: 1 | Status: SHIPPED | OUTPATIENT
Start: 2021-11-24

## 2021-12-22 ENCOUNTER — OFFICE VISIT (OUTPATIENT)
Dept: FAMILY MEDICINE CLINIC | Facility: CLINIC | Age: 61
End: 2021-12-22
Payer: COMMERCIAL

## 2021-12-22 DIAGNOSIS — Z53.21 PATIENT LEFT WITHOUT BEING SEEN: Primary | ICD-10-CM

## 2022-02-01 ENCOUNTER — MED REC SCAN ONLY (OUTPATIENT)
Dept: FAMILY MEDICINE CLINIC | Facility: CLINIC | Age: 62
End: 2022-02-01

## 2022-02-28 NOTE — TELEPHONE ENCOUNTER
Requesting refill on metformin. LOV 11/18/2021. Advised to F/U in 3 months. No appointment scheduled at this time. LF 11/18/2021.

## 2022-03-13 ENCOUNTER — PATIENT MESSAGE (OUTPATIENT)
Dept: FAMILY MEDICINE CLINIC | Facility: CLINIC | Age: 62
End: 2022-03-13

## 2022-03-14 NOTE — TELEPHONE ENCOUNTER
From: Carloz Flores. Sent: 3/13/2022 9:31 PM CDT  To: [facility] Clinical Staff  Subject: Medication refill    Another follow up? I just had my annual physical including blood work 3 months ago?

## 2022-03-15 ENCOUNTER — LAB ENCOUNTER (OUTPATIENT)
Dept: LAB | Age: 62
End: 2022-03-15
Attending: EMERGENCY MEDICINE
Payer: COMMERCIAL

## 2022-03-15 ENCOUNTER — OFFICE VISIT (OUTPATIENT)
Dept: FAMILY MEDICINE CLINIC | Facility: CLINIC | Age: 62
End: 2022-03-15
Payer: COMMERCIAL

## 2022-03-15 VITALS
WEIGHT: 221 LBS | OXYGEN SATURATION: 98 % | DIASTOLIC BLOOD PRESSURE: 82 MMHG | SYSTOLIC BLOOD PRESSURE: 116 MMHG | RESPIRATION RATE: 15 BRPM | HEART RATE: 64 BPM | BODY MASS INDEX: 31 KG/M2

## 2022-03-15 DIAGNOSIS — E11.9 TYPE 2 DIABETES MELLITUS WITHOUT COMPLICATION, WITHOUT LONG-TERM CURRENT USE OF INSULIN (HCC): ICD-10-CM

## 2022-03-15 DIAGNOSIS — E11.9 TYPE 2 DIABETES MELLITUS WITHOUT COMPLICATION, WITHOUT LONG-TERM CURRENT USE OF INSULIN (HCC): Primary | ICD-10-CM

## 2022-03-15 DIAGNOSIS — Z12.11 SCREENING FOR COLON CANCER: ICD-10-CM

## 2022-03-15 DIAGNOSIS — R74.8 ELEVATED LIVER ENZYMES: ICD-10-CM

## 2022-03-15 DIAGNOSIS — R91.1 PULMONARY NODULE: ICD-10-CM

## 2022-03-15 LAB
ALBUMIN SERPL-MCNC: 3.9 G/DL (ref 3.4–5)
ALBUMIN/GLOB SERPL: 1.2 {RATIO} (ref 1–2)
ALP LIVER SERPL-CCNC: 110 U/L
ALT SERPL-CCNC: 58 U/L
ANION GAP SERPL CALC-SCNC: 1 MMOL/L (ref 0–18)
AST SERPL-CCNC: 37 U/L (ref 15–37)
BILIRUB SERPL-MCNC: 0.6 MG/DL (ref 0.1–2)
BUN BLD-MCNC: 10 MG/DL (ref 7–18)
CALCIUM BLD-MCNC: 9.2 MG/DL (ref 8.5–10.1)
CHLORIDE SERPL-SCNC: 106 MMOL/L (ref 98–112)
CO2 SERPL-SCNC: 32 MMOL/L (ref 21–32)
CREAT BLD-MCNC: 0.8 MG/DL
CREAT UR-SCNC: 116 MG/DL
EST. AVERAGE GLUCOSE BLD GHB EST-MCNC: 126 MG/DL (ref 68–126)
FASTING STATUS PATIENT QL REPORTED: YES
GLOBULIN PLAS-MCNC: 3.3 G/DL (ref 2.8–4.4)
GLUCOSE BLD-MCNC: 130 MG/DL (ref 70–99)
HBA1C MFR BLD: 6 % (ref ?–5.7)
MICROALBUMIN UR-MCNC: 0.66 MG/DL
MICROALBUMIN/CREAT 24H UR-RTO: 5.7 UG/MG (ref ?–30)
OSMOLALITY SERPL CALC.SUM OF ELEC: 289 MOSM/KG (ref 275–295)
POTASSIUM SERPL-SCNC: 4.5 MMOL/L (ref 3.5–5.1)
PROT SERPL-MCNC: 7.2 G/DL (ref 6.4–8.2)
SODIUM SERPL-SCNC: 139 MMOL/L (ref 136–145)

## 2022-03-15 PROCEDURE — 80053 COMPREHEN METABOLIC PANEL: CPT | Performed by: EMERGENCY MEDICINE

## 2022-03-15 PROCEDURE — 3074F SYST BP LT 130 MM HG: CPT | Performed by: EMERGENCY MEDICINE

## 2022-03-15 PROCEDURE — 83036 HEMOGLOBIN GLYCOSYLATED A1C: CPT | Performed by: EMERGENCY MEDICINE

## 2022-03-15 PROCEDURE — 99214 OFFICE O/P EST MOD 30 MIN: CPT | Performed by: EMERGENCY MEDICINE

## 2022-03-15 PROCEDURE — 3044F HG A1C LEVEL LT 7.0%: CPT | Performed by: EMERGENCY MEDICINE

## 2022-03-15 PROCEDURE — 82043 UR ALBUMIN QUANTITATIVE: CPT | Performed by: EMERGENCY MEDICINE

## 2022-03-15 PROCEDURE — 3079F DIAST BP 80-89 MM HG: CPT | Performed by: EMERGENCY MEDICINE

## 2022-03-15 PROCEDURE — 3061F NEG MICROALBUMINURIA REV: CPT | Performed by: EMERGENCY MEDICINE

## 2022-03-15 PROCEDURE — 82570 ASSAY OF URINE CREATININE: CPT | Performed by: EMERGENCY MEDICINE

## 2022-03-17 ENCOUNTER — TELEPHONE (OUTPATIENT)
Dept: FAMILY MEDICINE CLINIC | Facility: CLINIC | Age: 62
End: 2022-03-17

## 2022-03-17 NOTE — TELEPHONE ENCOUNTER
----- Message from Vignesh Jackson MD sent at 3/17/2022  9:25 AM CDT -----  HBA1C much improved  Continiue plan as discussed  Follow up with OV  in 6 months

## 2022-05-10 ENCOUNTER — OFFICE VISIT (OUTPATIENT)
Dept: FAMILY MEDICINE CLINIC | Facility: CLINIC | Age: 62
End: 2022-05-10
Payer: COMMERCIAL

## 2022-05-10 VITALS
TEMPERATURE: 98 F | SYSTOLIC BLOOD PRESSURE: 138 MMHG | HEART RATE: 90 BPM | DIASTOLIC BLOOD PRESSURE: 80 MMHG | OXYGEN SATURATION: 100 % | RESPIRATION RATE: 18 BRPM

## 2022-05-10 DIAGNOSIS — J06.9 VIRAL URI WITH COUGH: Primary | ICD-10-CM

## 2022-05-10 LAB
OPERATOR ID: NORMAL
RAPID SARS-COV-2 BY PCR: NOT DETECTED

## 2022-05-10 PROCEDURE — 3075F SYST BP GE 130 - 139MM HG: CPT | Performed by: NURSE PRACTITIONER

## 2022-05-10 PROCEDURE — 99213 OFFICE O/P EST LOW 20 MIN: CPT | Performed by: NURSE PRACTITIONER

## 2022-05-10 PROCEDURE — 3079F DIAST BP 80-89 MM HG: CPT | Performed by: NURSE PRACTITIONER

## 2022-05-10 PROCEDURE — U0002 COVID-19 LAB TEST NON-CDC: HCPCS | Performed by: NURSE PRACTITIONER

## 2022-07-12 DIAGNOSIS — N40.0 BENIGN PROSTATIC HYPERPLASIA WITHOUT LOWER URINARY TRACT SYMPTOMS: ICD-10-CM

## 2022-07-12 DIAGNOSIS — I15.2 HYPERTENSION ASSOCIATED WITH DIABETES (HCC): ICD-10-CM

## 2022-07-12 DIAGNOSIS — E11.59 HYPERTENSION ASSOCIATED WITH DIABETES (HCC): ICD-10-CM

## 2022-07-14 RX ORDER — LISINOPRIL 2.5 MG/1
TABLET ORAL
Qty: 90 TABLET | Refills: 0 | Status: SHIPPED | OUTPATIENT
Start: 2022-07-14

## 2022-07-14 RX ORDER — TAMSULOSIN HYDROCHLORIDE 0.4 MG/1
CAPSULE ORAL
Qty: 90 CAPSULE | Refills: 0 | Status: SHIPPED | OUTPATIENT
Start: 2022-07-14

## 2022-08-23 DIAGNOSIS — E11.9 TYPE 2 DIABETES MELLITUS WITHOUT COMPLICATION, WITHOUT LONG-TERM CURRENT USE OF INSULIN (HCC): ICD-10-CM

## 2022-08-23 RX ORDER — ATORVASTATIN CALCIUM 10 MG/1
TABLET, FILM COATED ORAL
Qty: 90 TABLET | Refills: 0 | Status: SHIPPED | OUTPATIENT
Start: 2022-08-23

## 2022-11-21 DIAGNOSIS — E11.59 HYPERTENSION ASSOCIATED WITH DIABETES (HCC): ICD-10-CM

## 2022-11-21 DIAGNOSIS — I15.2 HYPERTENSION ASSOCIATED WITH DIABETES (HCC): ICD-10-CM

## 2022-11-21 RX ORDER — LISINOPRIL 2.5 MG/1
2.5 TABLET ORAL DAILY
Qty: 90 TABLET | Refills: 0 | Status: SHIPPED | OUTPATIENT
Start: 2022-11-21

## 2022-12-01 ENCOUNTER — TELEPHONE (OUTPATIENT)
Dept: FAMILY MEDICINE CLINIC | Facility: CLINIC | Age: 62
End: 2022-12-01

## 2022-12-01 NOTE — TELEPHONE ENCOUNTER
Rcvd DM eye exam dated 11/29/22 completed by Naresh Garsia. Reviewed by MD, copy sent to scanning, entered.

## 2022-12-09 ENCOUNTER — OFFICE VISIT (OUTPATIENT)
Dept: FAMILY MEDICINE CLINIC | Facility: CLINIC | Age: 62
End: 2022-12-09
Payer: COMMERCIAL

## 2022-12-09 VITALS
RESPIRATION RATE: 16 BRPM | HEART RATE: 64 BPM | HEIGHT: 71 IN | OXYGEN SATURATION: 98 % | WEIGHT: 217 LBS | BODY MASS INDEX: 30.38 KG/M2 | DIASTOLIC BLOOD PRESSURE: 80 MMHG | SYSTOLIC BLOOD PRESSURE: 110 MMHG

## 2022-12-09 DIAGNOSIS — Z23 NEED FOR VACCINATION: ICD-10-CM

## 2022-12-09 DIAGNOSIS — Z00.00 ENCOUNTER FOR ANNUAL PHYSICAL EXAM: Primary | ICD-10-CM

## 2022-12-09 DIAGNOSIS — E11.9 TYPE 2 DIABETES MELLITUS WITHOUT COMPLICATION, WITHOUT LONG-TERM CURRENT USE OF INSULIN (HCC): ICD-10-CM

## 2022-12-09 DIAGNOSIS — N40.0 BENIGN PROSTATIC HYPERPLASIA WITHOUT LOWER URINARY TRACT SYMPTOMS: ICD-10-CM

## 2022-12-09 DIAGNOSIS — I15.2 HYPERTENSION ASSOCIATED WITH DIABETES (HCC): ICD-10-CM

## 2022-12-09 DIAGNOSIS — Z12.5 SCREENING FOR PROSTATE CANCER: ICD-10-CM

## 2022-12-09 DIAGNOSIS — E11.59 HYPERTENSION ASSOCIATED WITH DIABETES (HCC): ICD-10-CM

## 2022-12-09 DIAGNOSIS — Z00.00 LABORATORY EXAM ORDERED AS PART OF ROUTINE GENERAL MEDICAL EXAMINATION: ICD-10-CM

## 2022-12-09 LAB
CARTRIDGE LOT#: 928 NUMERIC
HEMOGLOBIN A1C: 6.1 % (ref 4.3–5.6)

## 2022-12-09 RX ORDER — ATORVASTATIN CALCIUM 10 MG/1
10 TABLET, FILM COATED ORAL DAILY
Qty: 90 TABLET | Refills: 1 | Status: SHIPPED | OUTPATIENT
Start: 2022-12-09

## 2022-12-09 RX ORDER — TAMSULOSIN HYDROCHLORIDE 0.4 MG/1
0.4 CAPSULE ORAL DAILY
Qty: 90 CAPSULE | Refills: 1 | Status: SHIPPED | OUTPATIENT
Start: 2022-12-09

## 2022-12-09 RX ORDER — LISINOPRIL 2.5 MG/1
2.5 TABLET ORAL DAILY
Qty: 90 TABLET | Refills: 1 | Status: SHIPPED | OUTPATIENT
Start: 2022-12-09

## 2022-12-09 NOTE — PATIENT INSTRUCTIONS
Thank you for choosing [facility]  To Do:  FOR ALISTAIR Cannon.     Have blood tests done after fasting  Recommend Pneumonia and shingles vaccines  Follow up in 6 months for diabetes, sooner if needed  Contoinue all diabetic meds  Continue with weight loss  Arrange for colonoscopy, Dr. Adalid Portillo, previously referred

## 2023-10-05 ENCOUNTER — PATIENT MESSAGE (OUTPATIENT)
Dept: FAMILY MEDICINE CLINIC | Facility: CLINIC | Age: 63
End: 2023-10-05

## 2023-11-06 ENCOUNTER — PATIENT MESSAGE (OUTPATIENT)
Dept: FAMILY MEDICINE CLINIC | Facility: CLINIC | Age: 63
End: 2023-11-06

## 2023-11-07 NOTE — TELEPHONE ENCOUNTER
From: Hoang Devine. To: Candace Brooks  Sent: 11/6/2023 3:54 PM CST  Subject: Vaccines     I would like to receive my flu as well as updated covid vaccine; do I need to schedule an appointment for that?

## 2023-12-06 ENCOUNTER — LAB ENCOUNTER (OUTPATIENT)
Dept: LAB | Age: 63
End: 2023-12-06
Attending: EMERGENCY MEDICINE
Payer: COMMERCIAL

## 2023-12-06 DIAGNOSIS — Z12.5 SCREENING FOR PROSTATE CANCER: ICD-10-CM

## 2023-12-06 DIAGNOSIS — E11.9 TYPE 2 DIABETES MELLITUS WITHOUT COMPLICATION, WITHOUT LONG-TERM CURRENT USE OF INSULIN (HCC): ICD-10-CM

## 2023-12-06 DIAGNOSIS — Z00.00 LABORATORY EXAM ORDERED AS PART OF ROUTINE GENERAL MEDICAL EXAMINATION: ICD-10-CM

## 2023-12-06 DIAGNOSIS — N40.0 BENIGN PROSTATIC HYPERPLASIA WITHOUT LOWER URINARY TRACT SYMPTOMS: ICD-10-CM

## 2023-12-06 LAB
ALBUMIN SERPL-MCNC: 4 G/DL (ref 3.4–5)
ALBUMIN/GLOB SERPL: 1.1 {RATIO} (ref 1–2)
ALP LIVER SERPL-CCNC: 114 U/L
ALT SERPL-CCNC: 44 U/L
ANION GAP SERPL CALC-SCNC: 6 MMOL/L (ref 0–18)
AST SERPL-CCNC: 27 U/L (ref 15–37)
BASOPHILS # BLD AUTO: 0.04 X10(3) UL (ref 0–0.2)
BASOPHILS NFR BLD AUTO: 0.7 %
BILIRUB SERPL-MCNC: 0.8 MG/DL (ref 0.1–2)
BUN BLD-MCNC: 10 MG/DL (ref 9–23)
CALCIUM BLD-MCNC: 9.1 MG/DL (ref 8.5–10.1)
CHLORIDE SERPL-SCNC: 105 MMOL/L (ref 98–112)
CHOLEST SERPL-MCNC: 188 MG/DL (ref ?–200)
CO2 SERPL-SCNC: 29 MMOL/L (ref 21–32)
COMPLEXED PSA SERPL-MCNC: 0.57 NG/ML (ref ?–4)
CREAT BLD-MCNC: 1.06 MG/DL
EGFRCR SERPLBLD CKD-EPI 2021: 79 ML/MIN/1.73M2 (ref 60–?)
EOSINOPHIL # BLD AUTO: 0.29 X10(3) UL (ref 0–0.7)
EOSINOPHIL NFR BLD AUTO: 4.7 %
ERYTHROCYTE [DISTWIDTH] IN BLOOD BY AUTOMATED COUNT: 13.1 %
FASTING PATIENT LIPID ANSWER: YES
FASTING STATUS PATIENT QL REPORTED: YES
GLOBULIN PLAS-MCNC: 3.8 G/DL (ref 2.8–4.4)
GLUCOSE BLD-MCNC: 141 MG/DL (ref 70–99)
HCT VFR BLD AUTO: 44.6 %
HDLC SERPL-MCNC: 46 MG/DL (ref 40–59)
HGB BLD-MCNC: 15.8 G/DL
IMM GRANULOCYTES # BLD AUTO: 0.01 X10(3) UL (ref 0–1)
IMM GRANULOCYTES NFR BLD: 0.2 %
LDLC SERPL CALC-MCNC: 123 MG/DL (ref ?–100)
LYMPHOCYTES # BLD AUTO: 1.96 X10(3) UL (ref 1–4)
LYMPHOCYTES NFR BLD AUTO: 32 %
MCH RBC QN AUTO: 32.3 PG (ref 26–34)
MCHC RBC AUTO-ENTMCNC: 35.4 G/DL (ref 31–37)
MCV RBC AUTO: 91.2 FL
MONOCYTES # BLD AUTO: 0.5 X10(3) UL (ref 0.1–1)
MONOCYTES NFR BLD AUTO: 8.2 %
NEUTROPHILS # BLD AUTO: 3.32 X10 (3) UL (ref 1.5–7.7)
NEUTROPHILS # BLD AUTO: 3.32 X10(3) UL (ref 1.5–7.7)
NEUTROPHILS NFR BLD AUTO: 54.2 %
NONHDLC SERPL-MCNC: 142 MG/DL (ref ?–130)
OSMOLALITY SERPL CALC.SUM OF ELEC: 291 MOSM/KG (ref 275–295)
PLATELET # BLD AUTO: 202 10(3)UL (ref 150–450)
POTASSIUM SERPL-SCNC: 4 MMOL/L (ref 3.5–5.1)
PROT SERPL-MCNC: 7.8 G/DL (ref 6.4–8.2)
RBC # BLD AUTO: 4.89 X10(6)UL
SODIUM SERPL-SCNC: 140 MMOL/L (ref 136–145)
TRIGL SERPL-MCNC: 104 MG/DL (ref 30–149)
TSI SER-ACNC: 1.88 MIU/ML (ref 0.36–3.74)
VIT D+METAB SERPL-MCNC: 15.7 NG/ML (ref 30–100)
VLDLC SERPL CALC-MCNC: 18 MG/DL (ref 0–30)
WBC # BLD AUTO: 6.1 X10(3) UL (ref 4–11)

## 2023-12-06 PROCEDURE — 84153 ASSAY OF PSA TOTAL: CPT | Performed by: EMERGENCY MEDICINE

## 2023-12-06 PROCEDURE — 80050 GENERAL HEALTH PANEL: CPT | Performed by: EMERGENCY MEDICINE

## 2023-12-06 PROCEDURE — 80061 LIPID PANEL: CPT | Performed by: EMERGENCY MEDICINE

## 2023-12-06 PROCEDURE — 82306 VITAMIN D 25 HYDROXY: CPT | Performed by: EMERGENCY MEDICINE

## 2023-12-12 ENCOUNTER — OFFICE VISIT (OUTPATIENT)
Dept: FAMILY MEDICINE CLINIC | Facility: CLINIC | Age: 63
End: 2023-12-12
Payer: COMMERCIAL

## 2023-12-12 VITALS
BODY MASS INDEX: 30.52 KG/M2 | OXYGEN SATURATION: 98 % | HEIGHT: 71 IN | HEART RATE: 70 BPM | DIASTOLIC BLOOD PRESSURE: 68 MMHG | SYSTOLIC BLOOD PRESSURE: 118 MMHG | RESPIRATION RATE: 21 BRPM | WEIGHT: 218 LBS

## 2023-12-12 DIAGNOSIS — N40.0 BENIGN PROSTATIC HYPERPLASIA WITHOUT LOWER URINARY TRACT SYMPTOMS: ICD-10-CM

## 2023-12-12 DIAGNOSIS — Z00.00 ENCOUNTER FOR ANNUAL PHYSICAL EXAM: Primary | ICD-10-CM

## 2023-12-12 DIAGNOSIS — I15.2 HYPERTENSION ASSOCIATED WITH DIABETES  (HCC): ICD-10-CM

## 2023-12-12 DIAGNOSIS — Z23 NEED FOR VACCINATION: ICD-10-CM

## 2023-12-12 DIAGNOSIS — E11.9 TYPE 2 DIABETES MELLITUS WITHOUT COMPLICATION, WITHOUT LONG-TERM CURRENT USE OF INSULIN (HCC): ICD-10-CM

## 2023-12-12 DIAGNOSIS — E55.9 VITAMIN D DEFICIENCY: ICD-10-CM

## 2023-12-12 DIAGNOSIS — E11.59 HYPERTENSION ASSOCIATED WITH DIABETES  (HCC): ICD-10-CM

## 2023-12-12 DIAGNOSIS — Z12.11 SCREENING FOR COLON CANCER: ICD-10-CM

## 2023-12-12 LAB
CARTRIDGE LOT#: 612 NUMERIC
HEMOGLOBIN A1C: 6 % (ref 4.3–5.6)

## 2023-12-12 PROCEDURE — 3044F HG A1C LEVEL LT 7.0%: CPT | Performed by: EMERGENCY MEDICINE

## 2023-12-12 PROCEDURE — 90677 PCV20 VACCINE IM: CPT | Performed by: EMERGENCY MEDICINE

## 2023-12-12 PROCEDURE — 90472 IMMUNIZATION ADMIN EACH ADD: CPT | Performed by: EMERGENCY MEDICINE

## 2023-12-12 PROCEDURE — 90750 HZV VACC RECOMBINANT IM: CPT | Performed by: EMERGENCY MEDICINE

## 2023-12-12 PROCEDURE — 3078F DIAST BP <80 MM HG: CPT | Performed by: EMERGENCY MEDICINE

## 2023-12-12 PROCEDURE — 99214 OFFICE O/P EST MOD 30 MIN: CPT | Performed by: EMERGENCY MEDICINE

## 2023-12-12 PROCEDURE — 3074F SYST BP LT 130 MM HG: CPT | Performed by: EMERGENCY MEDICINE

## 2023-12-12 PROCEDURE — 3008F BODY MASS INDEX DOCD: CPT | Performed by: EMERGENCY MEDICINE

## 2023-12-12 PROCEDURE — 99396 PREV VISIT EST AGE 40-64: CPT | Performed by: EMERGENCY MEDICINE

## 2023-12-12 PROCEDURE — 90471 IMMUNIZATION ADMIN: CPT | Performed by: EMERGENCY MEDICINE

## 2023-12-12 PROCEDURE — 90686 IIV4 VACC NO PRSV 0.5 ML IM: CPT | Performed by: EMERGENCY MEDICINE

## 2023-12-12 PROCEDURE — 83036 HEMOGLOBIN GLYCOSYLATED A1C: CPT | Performed by: EMERGENCY MEDICINE

## 2023-12-12 RX ORDER — ERGOCALCIFEROL 1.25 MG/1
50000 CAPSULE ORAL WEEKLY
Qty: 4 CAPSULE | Refills: 2 | Status: SHIPPED | OUTPATIENT
Start: 2023-12-12 | End: 2024-03-05

## 2023-12-12 RX ORDER — ATORVASTATIN CALCIUM 10 MG/1
10 TABLET, FILM COATED ORAL DAILY
Qty: 90 TABLET | Refills: 1 | Status: SHIPPED | OUTPATIENT
Start: 2023-12-12

## 2023-12-12 RX ORDER — TAMSULOSIN HYDROCHLORIDE 0.4 MG/1
0.4 CAPSULE ORAL DAILY
Qty: 90 CAPSULE | Refills: 1 | Status: SHIPPED | OUTPATIENT
Start: 2023-12-12

## 2023-12-12 RX ORDER — BLOOD-GLUCOSE METER
1 EACH MISCELLANEOUS
Qty: 1 KIT | Refills: 0 | Status: SHIPPED | OUTPATIENT
Start: 2023-12-12 | End: 2023-12-12

## 2023-12-12 RX ORDER — LISINOPRIL 2.5 MG/1
2.5 TABLET ORAL DAILY
Qty: 90 TABLET | Refills: 1 | Status: SHIPPED | OUTPATIENT
Start: 2023-12-12

## 2023-12-12 RX ORDER — METFORMIN HYDROCHLORIDE 500 MG/1
500 TABLET, EXTENDED RELEASE ORAL 2 TIMES DAILY WITH MEALS
Qty: 180 TABLET | Refills: 0 | Status: SHIPPED | OUTPATIENT
Start: 2023-12-12

## 2023-12-12 RX ORDER — LANCETS 33 GAUGE
1 EACH MISCELLANEOUS
Qty: 100 EACH | Refills: 3 | Status: SHIPPED | OUTPATIENT
Start: 2023-12-12 | End: 2024-12-11

## 2023-12-12 RX ORDER — BLOOD SUGAR DIAGNOSTIC
STRIP MISCELLANEOUS
Qty: 100 STRIP | Refills: 3 | Status: SHIPPED | OUTPATIENT
Start: 2023-12-12 | End: 2024-12-11

## 2024-03-04 ENCOUNTER — PATIENT MESSAGE (OUTPATIENT)
Dept: FAMILY MEDICINE CLINIC | Facility: CLINIC | Age: 64
End: 2024-03-04

## 2024-03-04 DIAGNOSIS — E55.9 VITAMIN D DEFICIENCY: ICD-10-CM

## 2024-03-04 NOTE — TELEPHONE ENCOUNTER
From: Antonio Bhatt Jr.  To: Britt Desouza  Sent: 3/4/2024 4:39 PM CST  Subject: Eye exam    Who is the eye doctor you referred me to and I have used in the past?

## 2024-03-05 RX ORDER — ERGOCALCIFEROL 1.25 MG/1
50000 CAPSULE ORAL WEEKLY
Qty: 4 CAPSULE | Refills: 2 | OUTPATIENT
Start: 2024-03-05 | End: 2024-05-28

## 2024-03-05 NOTE — TELEPHONE ENCOUNTER
Denied. Will mychart pt back to complete blood work prior to visit to check Vitamin D level, at OV 3/12- further refills can be discussed.

## 2024-03-07 DIAGNOSIS — E11.9 TYPE 2 DIABETES MELLITUS WITHOUT COMPLICATION, WITHOUT LONG-TERM CURRENT USE OF INSULIN (HCC): ICD-10-CM

## 2024-03-07 RX ORDER — METFORMIN HYDROCHLORIDE 500 MG/1
500 TABLET, EXTENDED RELEASE ORAL 2 TIMES DAILY WITH MEALS
Qty: 180 TABLET | Refills: 0 | Status: SHIPPED | OUTPATIENT
Start: 2024-03-07

## 2024-03-22 ENCOUNTER — OFFICE VISIT (OUTPATIENT)
Dept: FAMILY MEDICINE CLINIC | Facility: CLINIC | Age: 64
End: 2024-03-22
Payer: COMMERCIAL

## 2024-03-22 ENCOUNTER — LAB ENCOUNTER (OUTPATIENT)
Dept: LAB | Age: 64
End: 2024-03-22
Attending: EMERGENCY MEDICINE
Payer: COMMERCIAL

## 2024-03-22 VITALS
HEIGHT: 71 IN | SYSTOLIC BLOOD PRESSURE: 100 MMHG | WEIGHT: 211 LBS | OXYGEN SATURATION: 99 % | HEART RATE: 78 BPM | BODY MASS INDEX: 29.54 KG/M2 | DIASTOLIC BLOOD PRESSURE: 60 MMHG

## 2024-03-22 DIAGNOSIS — E11.9 TYPE 2 DIABETES MELLITUS WITHOUT COMPLICATION, WITHOUT LONG-TERM CURRENT USE OF INSULIN (HCC): Primary | ICD-10-CM

## 2024-03-22 DIAGNOSIS — Z23 NEED FOR SHINGLES VACCINE: ICD-10-CM

## 2024-03-22 DIAGNOSIS — E11.9 TYPE 2 DIABETES MELLITUS WITHOUT COMPLICATION, WITHOUT LONG-TERM CURRENT USE OF INSULIN (HCC): ICD-10-CM

## 2024-03-22 DIAGNOSIS — E11.69 TYPE 2 DIABETES MELLITUS WITH HYPERCHOLESTEROLEMIA (HCC): ICD-10-CM

## 2024-03-22 DIAGNOSIS — E78.00 TYPE 2 DIABETES MELLITUS WITH HYPERCHOLESTEROLEMIA (HCC): ICD-10-CM

## 2024-03-22 LAB
ALBUMIN SERPL-MCNC: 4 G/DL (ref 3.4–5)
ALBUMIN/GLOB SERPL: 1.1 {RATIO} (ref 1–2)
ALP LIVER SERPL-CCNC: 117 U/L
ALT SERPL-CCNC: 38 U/L
ANION GAP SERPL CALC-SCNC: 5 MMOL/L (ref 0–18)
AST SERPL-CCNC: 27 U/L (ref 15–37)
BILIRUB SERPL-MCNC: 0.8 MG/DL (ref 0.1–2)
BUN BLD-MCNC: 15 MG/DL (ref 9–23)
CALCIUM BLD-MCNC: 9.2 MG/DL (ref 8.5–10.1)
CARTRIDGE LOT#: 648 NUMERIC
CHLORIDE SERPL-SCNC: 106 MMOL/L (ref 98–112)
CHOLEST SERPL-MCNC: 129 MG/DL (ref ?–200)
CO2 SERPL-SCNC: 29 MMOL/L (ref 21–32)
CREAT BLD-MCNC: 1.1 MG/DL
CREAT UR-SCNC: 169 MG/DL
EGFRCR SERPLBLD CKD-EPI 2021: 76 ML/MIN/1.73M2 (ref 60–?)
EST. AVERAGE GLUCOSE BLD GHB EST-MCNC: 137 MG/DL (ref 68–126)
FASTING PATIENT LIPID ANSWER: YES
FASTING STATUS PATIENT QL REPORTED: YES
GLOBULIN PLAS-MCNC: 3.8 G/DL (ref 2.8–4.4)
GLUCOSE BLD-MCNC: 124 MG/DL (ref 70–99)
HBA1C MFR BLD: 6.4 % (ref ?–5.7)
HDLC SERPL-MCNC: 48 MG/DL (ref 40–59)
HEMOGLOBIN A1C: 6.6 % (ref 4.3–5.6)
LDLC SERPL CALC-MCNC: 65 MG/DL (ref ?–100)
MICROALBUMIN UR-MCNC: 1.1 MG/DL
MICROALBUMIN/CREAT 24H UR-RTO: 6.5 UG/MG (ref ?–30)
NONHDLC SERPL-MCNC: 81 MG/DL (ref ?–130)
OSMOLALITY SERPL CALC.SUM OF ELEC: 292 MOSM/KG (ref 275–295)
POTASSIUM SERPL-SCNC: 4 MMOL/L (ref 3.5–5.1)
PROT SERPL-MCNC: 7.8 G/DL (ref 6.4–8.2)
SODIUM SERPL-SCNC: 140 MMOL/L (ref 136–145)
TRIGL SERPL-MCNC: 84 MG/DL (ref 30–149)
VLDLC SERPL CALC-MCNC: 13 MG/DL (ref 0–30)

## 2024-03-22 PROCEDURE — 90750 HZV VACC RECOMBINANT IM: CPT | Performed by: EMERGENCY MEDICINE

## 2024-03-22 PROCEDURE — 82570 ASSAY OF URINE CREATININE: CPT

## 2024-03-22 PROCEDURE — 83036 HEMOGLOBIN GLYCOSYLATED A1C: CPT

## 2024-03-22 PROCEDURE — 99214 OFFICE O/P EST MOD 30 MIN: CPT | Performed by: EMERGENCY MEDICINE

## 2024-03-22 PROCEDURE — 3044F HG A1C LEVEL LT 7.0%: CPT | Performed by: EMERGENCY MEDICINE

## 2024-03-22 PROCEDURE — 3074F SYST BP LT 130 MM HG: CPT | Performed by: EMERGENCY MEDICINE

## 2024-03-22 PROCEDURE — 83036 HEMOGLOBIN GLYCOSYLATED A1C: CPT | Performed by: EMERGENCY MEDICINE

## 2024-03-22 PROCEDURE — 3078F DIAST BP <80 MM HG: CPT | Performed by: EMERGENCY MEDICINE

## 2024-03-22 PROCEDURE — 80061 LIPID PANEL: CPT

## 2024-03-22 PROCEDURE — 3008F BODY MASS INDEX DOCD: CPT | Performed by: EMERGENCY MEDICINE

## 2024-03-22 PROCEDURE — 82043 UR ALBUMIN QUANTITATIVE: CPT

## 2024-03-22 PROCEDURE — 80053 COMPREHEN METABOLIC PANEL: CPT

## 2024-03-22 PROCEDURE — 90471 IMMUNIZATION ADMIN: CPT | Performed by: EMERGENCY MEDICINE

## 2024-03-22 RX ORDER — ERGOCALCIFEROL 1.25 MG/1
50000 CAPSULE ORAL WEEKLY
COMMUNITY
Start: 2024-03-13

## 2024-03-22 RX ORDER — METFORMIN HYDROCHLORIDE 500 MG/1
TABLET, EXTENDED RELEASE ORAL
Qty: 270 TABLET | Refills: 2 | Status: SHIPPED | OUTPATIENT
Start: 2024-03-22

## 2024-03-22 NOTE — PROGRESS NOTES
Chief Complaint:   Chief Complaint   Patient presents with    Diabetes     Diabetic follow-up     HPI:   This is a 63 year old male         DIABETES    On Metformin 500 mg XR BID  No Blood sugar checks since last OV  No more diarrhea or stomach upset  +modest weight loss  Has cut down on food portions and has likely lost weight over the past 2 years.          Wt Readings from Last 6 Encounters:   03/22/24 211 lb (95.7 kg)   12/12/23 218 lb (98.9 kg)   12/09/22 217 lb (98.4 kg)   03/15/22 221 lb (100.2 kg)   11/18/21 227 lb (103 kg)   04/01/21 228 lb (103.4 kg)           BP Readings from Last 5 Encounters:   03/22/24 100/60   12/12/23 118/68   12/09/22 110/80   05/10/22 138/80   03/15/22 116/82       PMSH       Past Medical History:   Diagnosis Date    Arthritis     RT shoulder    Diabetes type 2, controlled (HCC)     Elevated liver enzymes     Fatty liver     Hypercholesterolemia      Past Surgical History:   Procedure Laterality Date    COLONOSCOPY  1 1/2 ago    wnl     Social History:  Social History     Socioeconomic History    Marital status:    Tobacco Use    Smoking status: Never    Smokeless tobacco: Never   Substance and Sexual Activity    Alcohol use: Yes     Comment: 6 beer per wk    Drug use: No     Family History:  Family History   Problem Relation Age of Onset    Diabetes Mother      Allergies:  No Known Allergies  Current Meds:  Current Outpatient Medications on File Prior to Visit   Medication Sig Dispense Refill    ergocalciferol 1.25 MG (27285 UT) Oral Cap Take 1 capsule (50,000 Units total) by mouth once a week.      atorvastatin 10 MG Oral Tab Take 1 tablet (10 mg total) by mouth daily. 90 tablet 1    tamsulosin 0.4 MG Oral Cap Take 1 capsule (0.4 mg total) by mouth daily. 90 capsule 1    lisinopril 2.5 MG Oral Tab Take 1 tablet (2.5 mg total) by mouth daily. 90 tablet 1    Glucose Blood (ONETOUCH VERIO) In Vitro Strip Test blood glucose levels once a day before breakfast. 100 strip 3     OneTouch Delica Lancets 33G Does not apply Misc 1 Lancet by Finger stick route every morning before breakfast. 100 each 3    [DISCONTINUED] metFORMIN  MG Oral Tablet 24 Hr Take 1 tablet (500 mg total) by mouth 2 (two) times daily with meals. 180 tablet 0     No current facility-administered medications on file prior to visit.      Counseling given: Not Answered         PROBLEM LIST     Patient Active Problem List   Diagnosis    Type 2 diabetes mellitus with hypercholesterolemia (HCC)    Helicobacter pylori (H. pylori)    Elevated liver enzymes    Fatty liver    Hypercholesteremia    Erectile dysfunction    Eczema    Pulmonary nodule    Family history of prostate cancer    Vitamin D deficiency             PHYSICAL EXAM:   /60   Pulse 78   Ht 5' 11\" (1.803 m)   Wt 211 lb (95.7 kg)   SpO2 99%   BMI 29.43 kg/m²  Estimated body mass index is 29.43 kg/m² as calculated from the following:    Height as of this encounter: 5' 11\" (1.803 m).    Weight as of this encounter: 211 lb (95.7 kg).   Vital signs reviewed.Appears stated age, well groomed.  GENERAL: well developed, well nourished, well hydrated, no distress  SKIN: good skin turgor, no obvious rashes  HEENT: atraumatic, normocephalic, ears, nose and throat are clear  EYES: sclera non icteric bilateral  NECK: supple, no adenopathy, no thyromegaly  LUNGS: clear to auscultation, no RRW  CARDIO: RRR without murmur  EXTREMITIES: no cyanosis, clubbing or edema  GI:soft Nontender Normoactive BS.  No palpable masses no guarding rigidity no rebound tenderness    Recent Results (from the past 672 hour(s))   HEMOGLOBIN A1C    Collection Time: 03/22/24  9:30 AM   Result Value Ref Range    HEMOGLOBIN A1C 6.6 (A) 4.3 - 5.6 %    Cartridge Lot# [id number] Numeric    Cartridge Expiration Date 3/1/26 Date           ASSESSMENT AND PLAN:         1. Type 2 diabetes mellitus without complication, without long-term current use of insulin (HCC)  - HEMOGLOBIN A1C  - OPHTHALMOLOGY -  INTERNAL  - metFORMIN  MG Oral Tablet 24 Hr; Take 2 tablets in a.m. and 1 tablet in p.m.  Dispense: 270 tablet; Refill: 2  Hemoglobin A1c increased since last office visit.  Okay to increase metformin to 1500 total a day.  Recommend daily blood sugar checks but patient declines and refuses.  Also recommend continuous glucose monitor freestyle hieu but patient declines today.  Recommend jumpstart program for continued weight loss and nutritional guidance.  Okay to follow-up in 3 months for recheck.      2. Need for shingles vaccine  - ZOSTER VACC RECOMBINANT IM NJX    3. Type 2 diabetes mellitus with hypercholesterolemia (HCC)  For recheck await labs labs previously pended      PATIENT INSTRUCTIONS:    Arrange for colonoscopy Dr Montes De Oca  Increase Metformin to 2 tabs in AM and 1 tab in PM  Follow up in 3 months  Recommend blood sugar checks  Contoinue with weigh tloss  Recommend Jumpstart Program  Arrange for diabetic eye exam        FOLLOW UP: 3 months            Health Maintenance Due   Topic Date Due    Colorectal Cancer Screening  05/02/2022    Diabetes Care Foot Exam  03/15/2023    Diabetes Care: Microalb/Creat Ratio  03/15/2023    COVID-19 Vaccine (5 - 2023-24 season) 09/01/2023    Diabetes Care Dilated Eye Exam  11/29/2023    Annual Depression Screening  01/01/2024    Zoster Vaccines (2 of 2) 02/06/2024

## 2024-03-22 NOTE — PATIENT INSTRUCTIONS
Thank you for choosing [facility]  To Do:  FOR ALISTAIR ARAUZ JR.      Arrange for colonoscopy Dr Delgado  Increase Metformin to 2 tabs in AM and 1 tab in PM  Follow up in 3 months  Recommend blood sugar checks  Contoinue with weigh tloss  Recommend Jumpstart Program  Arrange for diabetic eye exam            [facility] General Surgery:     Dr. Pauline Delgado    [address], [address]  [place] [address]    Phone:[phone]  Fax:[phone]

## 2024-05-02 RX ORDER — ERGOCALCIFEROL 1.25 MG/1
50000 CAPSULE ORAL WEEKLY
Qty: 4 CAPSULE | Refills: 0 | OUTPATIENT
Start: 2024-05-02

## 2024-05-02 NOTE — TELEPHONE ENCOUNTER
Last ordered: 1 month ago (3/22/2024) by Reported External/Patient     Last refill: 4/5/2024

## 2024-06-06 DIAGNOSIS — N40.0 BENIGN PROSTATIC HYPERPLASIA WITHOUT LOWER URINARY TRACT SYMPTOMS: ICD-10-CM

## 2024-06-06 DIAGNOSIS — E11.9 TYPE 2 DIABETES MELLITUS WITHOUT COMPLICATION, WITHOUT LONG-TERM CURRENT USE OF INSULIN (HCC): ICD-10-CM

## 2024-06-06 DIAGNOSIS — I15.2 HYPERTENSION ASSOCIATED WITH DIABETES (HCC): ICD-10-CM

## 2024-06-06 DIAGNOSIS — E11.59 HYPERTENSION ASSOCIATED WITH DIABETES (HCC): ICD-10-CM

## 2024-06-06 RX ORDER — ATORVASTATIN CALCIUM 10 MG/1
10 TABLET, FILM COATED ORAL DAILY
Qty: 90 TABLET | Refills: 1 | Status: SHIPPED | OUTPATIENT
Start: 2024-06-06

## 2024-06-06 RX ORDER — TAMSULOSIN HYDROCHLORIDE 0.4 MG/1
0.4 CAPSULE ORAL DAILY
Qty: 90 CAPSULE | Refills: 1 | Status: SHIPPED | OUTPATIENT
Start: 2024-06-06

## 2024-06-06 RX ORDER — LISINOPRIL 2.5 MG/1
2.5 TABLET ORAL DAILY
Qty: 90 TABLET | Refills: 1 | Status: SHIPPED | OUTPATIENT
Start: 2024-06-06

## 2024-07-13 ENCOUNTER — PATIENT MESSAGE (OUTPATIENT)
Dept: FAMILY MEDICINE CLINIC | Facility: CLINIC | Age: 64
End: 2024-07-13

## 2024-07-13 DIAGNOSIS — L30.9 ECZEMA, UNSPECIFIED TYPE: ICD-10-CM

## 2024-07-15 NOTE — TELEPHONE ENCOUNTER
From: Antonio Bhatt Jr.  To: Britt Desouza  Sent: 7/13/2024 1:40 PM CDT  Subject: Medicine refills     I had previously used, and now am in need of a refill for Hydrocortisone Cream 2.5%

## 2024-08-13 ENCOUNTER — OFFICE VISIT (OUTPATIENT)
Facility: LOCATION | Age: 64
End: 2024-08-13
Payer: COMMERCIAL

## 2024-08-13 VITALS — TEMPERATURE: 97 F | HEART RATE: 70 BPM

## 2024-08-13 DIAGNOSIS — Z12.11 SCREEN FOR COLON CANCER: Primary | ICD-10-CM

## 2024-08-13 PROCEDURE — S0285 CNSLT BEFORE SCREEN COLONOSC: HCPCS | Performed by: SURGERY

## 2024-08-13 RX ORDER — POLYETHYLENE GLYCOL 3350, SODIUM CHLORIDE, SODIUM BICARBONATE, POTASSIUM CHLORIDE 420; 11.2; 5.72; 1.48 G/4L; G/4L; G/4L; G/4L
POWDER, FOR SOLUTION ORAL
Qty: 1 EACH | Refills: 0 | Status: SHIPPED | OUTPATIENT
Start: 2024-08-13

## 2024-08-13 NOTE — H&P
Antonio Bhatt Jr. is a 63 year old male  Chief Complaint   Patient presents with    Colonoscopy     NP- Cscope Consult. Last cscope in 2012 in [place]- denies symptoms, denies family hx of colon cancer        REFERRED BY    Patient presents for colonoscopy.  Patient's last colonoscopy was over 10 years ago in [place] he states that was a normal examination no polyps no masses patient denies family history of colon cancer or colon polyps he denies chest pain or chest pressure he is very active at work       .           Past Medical History:    Arthritis    RT shoulder    Diabetes type 2, controlled (HCC)    Elevated liver enzymes    Fatty liver    Hypercholesterolemia     Past Surgical History:   Procedure Laterality Date    Colonoscopy  1 1/2 ago    wnl     Current Outpatient Medications on File Prior to Visit   Medication Sig Dispense Refill    hydrocortisone 2.5 % External Cream Apply 1 Application  topically 2 (two) times daily as needed. Do not use consecutively for more than 10- 14 days 453 g 1    atorvastatin 10 MG Oral Tab Take 1 tablet by mouth once daily 90 tablet 1    lisinopril 2.5 MG Oral Tab Take 1 tablet by mouth once daily 90 tablet 1    tamsulosin 0.4 MG Oral Cap Take 1 capsule by mouth once daily 90 capsule 1    metFORMIN  MG Oral Tablet 24 Hr Take 2 tablets in a.m. and 1 tablet in p.m. 270 tablet 2    Glucose Blood (ONETOUCH VERIO) In Vitro Strip Test blood glucose levels once a day before breakfast. 100 strip 3    OneTouch Delica Lancets 33G Does not apply Misc 1 Lancet by Finger stick route every morning before breakfast. 100 each 3     No current facility-administered medications on file prior to visit.     @ALL@  Family History   Problem Relation Age of Onset    Diabetes Mother      Social History     Socioeconomic History    Marital status:    Tobacco Use    Smoking status: Never    Smokeless tobacco: Never   Substance and Sexual Activity    Alcohol use: Yes     Comment: 6 beer  per wk    Drug use: No       ROS     GENERAL HEALTH: otherwise feels well, no weight loss, no fever or chills  SKIN: denies any unusual skin rashes or jaundice  HEENT: denies nasal congestion, sinus pain or sore throat; hearing loss negative,   EYES , no diplopia or vision changes  RESPIRATORY: denies shortness of breath, wheezing or cough   CARDIOVASCULAR: denies chest pain or MALONE; no palpitations   GI: denies nausea, vomiting, constipation, diarrhea; no rectal bleeding; no heartburn  GENITAL/: no dysuria, urgency or frequency, no tea colored urine  MUSCULOSKELETAL: no joint complaints upper or lower extremities  HEMATOLOGY: denies hx anemia; denies bruising or excessive bleeding  Endocrine: no weight gain or loss no hot or cold intolerance    EXAM     Pulse 70, temperature 96.9 °F (36.1 °C), temperature source Temporal.  GENERAL: well developed, well nourished male, in no apparent distress.    MENTAL STATUS :Alert, oriented x 3  PSYCH: normal mood and affect  SKIN: anicteric, no rashes, no bruising  EYES: PERRLA, EOMI, sclera anicteric,  conjunctiva without pallor  HEENT: normocephalic, atraumatic, TMs clear, nares patent, mouth moist, pharynx w/o erythema  NECK: supple, no JVD, no adenopathy, no thyromegaly  RESPIRATORY: clear to auscultation, no rales , rhonchi or wheezing  CARDIOVASCULAR: RRR, murmur negative  ABDOMEN: normal active BS, soft, nondistended  no HSM, no masses or hernias  LYMPHATIC: no axillary , supraclavicular or inguinal lymphadenopathy  EXTREMITIES: no cyanosis, clubbing or edema, no atrophy, full ROM    STUDIES:     [facility] Lab Encounter on 03/22/2024   Component Date Value Ref Range Status    Microalbumin, Urine 03/22/2024 1.10  mg/dL Final    Creatinine Ur Random 03/22/2024 169.00  mg/dL Final    Malb/Cre Calc 03/22/2024 6.5  <=30.0 ug/mg Final    <30 ug/mg creatinine       Normal     ug/mg creatinine   Microalbuminuria   >300 ug/mg creatinine      Albuminuria        Cholesterol, Total  03/22/2024 129  <200 mg/dL Final    Desirable  <200 mg/dL  Borderline  200-239 mg/dL  High      >=240 mg/dL        HDL Cholesterol 03/22/2024 48  40 - 59 mg/dL Final    Interpretive Information:   An HDL cholesterol <40 mg/dL is low and constitutes a coronary heart disease risk factor. An HDL cholesterol >60 mg/dL is a negative risk factor for coronary heart disease.        Triglycerides 03/22/2024 84  30 - 149 mg/dL Final    Reference interval for fasting triglycerides  Desirable: <150 mg/dL  Borderline: 150-199 mg/dL  High: 200-499 mg/dL  Very High: >=500 mg/dL          LDL Cholesterol 03/22/2024 65  <100 mg/dL Final    Desirable <100 mg/dL   Borderline 100-129 mg/dL   High     >=130mg/dL        VLDL 03/22/2024 13  0 - 30 mg/dL Final    Non HDL Chol 03/22/2024 81  <130 mg/dL Final    Desirable  <130 mg/dL   Borderline  130-159 mg/dL   High        160-189 mg/dL       Very high >=190 mg/dL        Patient Fasting for Lipid? 03/22/2024 Yes   Final    HgbA1C 03/22/2024 6.4 (H)  <5.7 % Final     Normal HbA1C:     <5.7%      Pre-Diabetic:     5.7 - 6.4%      Diabetic:         >6.4%      Diabetic Control: <7.0%        Estimated Average Glucose 03/22/2024 137 (H)  68 - 126 mg/dL Final    eAG is the estimated average glucose calculated from Hgb A1c according to the formula recommended by the American Diabetes Association. eAG levels reflect the long term average glucose and may not correlate with random or fasting glucose levels since these represent specific points in time.           Glucose 03/22/2024 124 (H)  70 - 99 mg/dL Final    Sodium 03/22/2024 140  136 - 145 mmol/L Final    Potassium 03/22/2024 4.0  3.5 - 5.1 mmol/L Final    Chloride 03/22/2024 106  98 - 112 mmol/L Final    CO2 03/22/2024 29.0  21.0 - 32.0 mmol/L Final    Anion Gap 03/22/2024 5  0 - 18 mmol/L Final    BUN 03/22/2024 15  9 - 23 mg/dL Final    Creatinine 03/22/2024 1.10  0.70 - 1.30 mg/dL Final    Calcium, Total 03/22/2024 9.2  8.5 - 10.1 mg/dL Final     Calculated Osmolality 03/22/2024 292  275 - 295 mOsm/kg Final    eGFR-Cr 03/22/2024 76  >=60 mL/min/1.73m2 Final    AST 03/22/2024 27  15 - 37 U/L Final    ALT 03/22/2024 38  16 - 61 U/L Final    Alkaline Phosphatase 03/22/2024 117  45 - 117 U/L Final    Bilirubin, Total 03/22/2024 0.8  0.1 - 2.0 mg/dL Final    Total Protein 03/22/2024 7.8  6.4 - 8.2 g/dL Final    Albumin 03/22/2024 4.0  3.4 - 5.0 g/dL Final    Globulin  03/22/2024 3.8  2.8 - 4.4 g/dL Final    A/G Ratio 03/22/2024 1.1  1.0 - 2.0 Final    Patient Fasting for CMP? 03/22/2024 Yes   Final       ASSESSMENT   Imp: Average risk screening colonoscopy  PLan: Colonoscopy discussed with patient risk of bleeding and bowel perforation with surgery to repair      Meds & Refills for this Visit:  Requested Prescriptions      No prescriptions requested or ordered in this encounter       Imaging & Consults:  None

## 2024-10-01 ENCOUNTER — TELEPHONE (OUTPATIENT)
Facility: LOCATION | Age: 64
End: 2024-10-01

## 2024-11-21 ENCOUNTER — TELEPHONE (OUTPATIENT)
Facility: LOCATION | Age: 64
End: 2024-11-21

## 2024-11-21 NOTE — TELEPHONE ENCOUNTER
ALISTAIR ARAUZ JR Patient  Member ID  [id number]    Date of Birth  1960-09-16    Gender  Male    Transaction Type  Outpatient Authorization    Organization  [facility]    Payer  [facility] [employer]     Certificate Information  Reference Number  [id number]    Status  NO ACTION REQUIRED    Message  Requested Service does not require preauthorization. We would strongly encourage you to check benefits for this service.    Member Information  Patient Name  ALISTAIR ARAUZ JR    Patient Date of Birth  1960-09-16    Patient Gender  Male    Member ID  [id number]    Relationship to Subscriber  Self    Subscriber Name  ALISTAIR ARAUZ JR    Requesting Provider     Name  PATRIZIA FAUSTIN    NPI  [id number]    Tax Id  [id number]    Specialty  [id number]    Provider Role  Provider    Address  [address] [place] [address]    Phone  [phone]    Fax  [phone]    Contact Name  NOE SOSA    Service Information  Service Type  2 - Surgical    [facility]    Service From - To Date  2024-12-05 - 2024-12-31    Level of Service  Elective    Diagnosis Code 1   - Encounter for screening for malignant neoplasm of colon    Procedure Code 1 (CPT/HCPCS)  16964 - DIAGNOSTIC COLONOSCOPY    Quantity  1 Units    Status  NO ACTION REQUIRED

## 2024-12-02 ENCOUNTER — TELEPHONE (OUTPATIENT)
Facility: LOCATION | Age: 64
End: 2024-12-02

## 2024-12-02 NOTE — TELEPHONE ENCOUNTER
Received a message from [facility] that pt would like to reschedule due to being sick    Spoke with patient and is rescheduled on 3/5/25 with Dr. Pelletier

## 2025-01-09 DIAGNOSIS — E11.59 HYPERTENSION ASSOCIATED WITH DIABETES (HCC): ICD-10-CM

## 2025-01-09 DIAGNOSIS — E11.9 TYPE 2 DIABETES MELLITUS WITHOUT COMPLICATION, WITHOUT LONG-TERM CURRENT USE OF INSULIN (HCC): ICD-10-CM

## 2025-01-09 DIAGNOSIS — N40.0 BENIGN PROSTATIC HYPERPLASIA WITHOUT LOWER URINARY TRACT SYMPTOMS: ICD-10-CM

## 2025-01-09 DIAGNOSIS — I15.2 HYPERTENSION ASSOCIATED WITH DIABETES (HCC): ICD-10-CM

## 2025-01-09 NOTE — TELEPHONE ENCOUNTER
Medication(s) to Refill:   Requested Prescriptions     Pending Prescriptions Disp Refills    ATORVASTATIN 10 MG Oral Tab [Pharmacy Med Name: Atorvastatin Calcium 10 MG Oral Tablet] 90 tablet 0     Sig: Take 1 tablet by mouth once daily    LISINOPRIL 2.5 MG Oral Tab [Pharmacy Med Name: Lisinopril 2.5 MG Oral Tablet] 90 tablet 0     Sig: Take 1 tablet by mouth once daily    TAMSULOSIN 0.4 MG Oral Cap [Pharmacy Med Name: Tamsulosin HCl 0.4 MG Oral Capsule] 90 capsule 0     Sig: Take 1 capsule by mouth once daily         Reason for Medication Refill being sent to Provider / Reason Protocol Failed:  [] 90 day refill has already been granted  [] Blood Pressure out of range  [] Labs Abnormal/over due  [] Medication not previously prescribed by Provider  [] Non-Protocol Medication  [] Controlled Substance   [] Due for appointment- no future appointment scheduled  [] No Follow up specified      Last Time Medication was Filled:  6/6/24      Last Office Visit with PCP: 3/22/24    When Patient was Due Back to the Office:  3 months  (from when PCP last addressed condition)    Future Appointments:  No future appointments.      Last Blood Pressures:  BP Readings from Last 2 Encounters:   03/22/24 100/60   12/12/23 118/68           Action taken:  [] Refill approved per protocol  [] Routing to provider for approval

## 2025-01-16 ENCOUNTER — TELEPHONE (OUTPATIENT)
Dept: FAMILY MEDICINE CLINIC | Facility: CLINIC | Age: 65
End: 2025-01-16

## 2025-01-16 ENCOUNTER — HOSPITAL ENCOUNTER (EMERGENCY)
Age: 65
Discharge: ANOTHER HOSPITAL | End: 2025-01-16
Attending: EMERGENCY MEDICINE
Payer: COMMERCIAL

## 2025-01-16 ENCOUNTER — APPOINTMENT (OUTPATIENT)
Dept: GENERAL RADIOLOGY | Age: 65
End: 2025-01-16
Attending: PHYSICIAN ASSISTANT
Payer: COMMERCIAL

## 2025-01-16 VITALS
SYSTOLIC BLOOD PRESSURE: 123 MMHG | OXYGEN SATURATION: 100 % | BODY MASS INDEX: 27.77 KG/M2 | DIASTOLIC BLOOD PRESSURE: 80 MMHG | HEART RATE: 88 BPM | WEIGHT: 205 LBS | HEIGHT: 72 IN | TEMPERATURE: 100 F | RESPIRATION RATE: 12 BRPM

## 2025-01-16 DIAGNOSIS — E11.9 TYPE 2 DIABETES MELLITUS WITHOUT COMPLICATION, WITHOUT LONG-TERM CURRENT USE OF INSULIN (HCC): ICD-10-CM

## 2025-01-16 DIAGNOSIS — C95.90 LEUKEMIA NOT HAVING ACHIEVED REMISSION, UNSPECIFIED LEUKEMIA TYPE (HCC): Primary | ICD-10-CM

## 2025-01-16 LAB
ALBUMIN SERPL-MCNC: 4.2 G/DL (ref 3.2–4.8)
ALBUMIN/GLOB SERPL: 1.4 {RATIO} (ref 1–2)
ALP LIVER SERPL-CCNC: 101 U/L
ALT SERPL-CCNC: 30 U/L
ANION GAP SERPL CALC-SCNC: 8 MMOL/L (ref 0–18)
ANION GAP SERPL CALC-SCNC: 8 MMOL/L (ref 0–18)
ANTIBODY SCREEN: NEGATIVE
APTT PPP: 30.5 SECONDS (ref 23–36)
AST SERPL-CCNC: 20 U/L (ref ?–34)
BILIRUB SERPL-MCNC: 0.4 MG/DL (ref 0.2–1.1)
BUN BLD-MCNC: 15 MG/DL (ref 9–23)
BUN BLD-MCNC: 16 MG/DL (ref 9–23)
CALCIUM BLD-MCNC: 9.1 MG/DL (ref 8.7–10.6)
CALCIUM BLD-MCNC: 9.2 MG/DL (ref 8.7–10.6)
CHLORIDE SERPL-SCNC: 107 MMOL/L (ref 98–112)
CHLORIDE SERPL-SCNC: 107 MMOL/L (ref 98–112)
CO2 SERPL-SCNC: 23 MMOL/L (ref 21–32)
CO2 SERPL-SCNC: 23 MMOL/L (ref 21–32)
CREAT BLD-MCNC: 0.83 MG/DL
CREAT BLD-MCNC: 0.83 MG/DL
D DIMER PPP FEU-MCNC: 1.89 UG/ML FEU (ref ?–0.63)
EGFRCR SERPLBLD CKD-EPI 2021: 98 ML/MIN/1.73M2 (ref 60–?)
EGFRCR SERPLBLD CKD-EPI 2021: 98 ML/MIN/1.73M2 (ref 60–?)
ERYTHROCYTE [DISTWIDTH] IN BLOOD BY AUTOMATED COUNT: 16.1 %
FIBRINOGEN PPP-MCNC: 231 MG/DL (ref 200–480)
GLOBULIN PLAS-MCNC: 2.9 G/DL (ref 2–3.5)
GLUCOSE BLD-MCNC: 106 MG/DL (ref 70–99)
GLUCOSE BLD-MCNC: 111 MG/DL (ref 70–99)
HAPTOGLOB SERPL-MCNC: 141 MG/DL (ref 30–200)
HCT VFR BLD AUTO: 13.4 %
HGB BLD-MCNC: 4.7 G/DL
INR BLD: 1.1 (ref 0.8–1.2)
LDH SERPL L TO P-CCNC: 233 U/L
MCH RBC QN AUTO: 32.9 PG (ref 26–34)
MCHC RBC AUTO-ENTMCNC: 35.1 G/DL (ref 31–37)
MCV RBC AUTO: 93.7 FL
NEUTROPHILS # BLD AUTO: 0.82 X10 (3) UL (ref 1.5–7.7)
NT-PROBNP SERPL-MCNC: 315 PG/ML (ref ?–125)
OSMOLALITY SERPL CALC.SUM OF ELEC: 288 MOSM/KG (ref 275–295)
OSMOLALITY SERPL CALC.SUM OF ELEC: 288 MOSM/KG (ref 275–295)
PLATELET # BLD AUTO: 169 10(3)UL (ref 150–450)
POCT INFLUENZA A: NEGATIVE
POCT INFLUENZA B: NEGATIVE
POTASSIUM SERPL-SCNC: 4 MMOL/L (ref 3.5–5.1)
POTASSIUM SERPL-SCNC: 4.3 MMOL/L (ref 3.5–5.1)
PROT SERPL-MCNC: 7.1 G/DL (ref 5.7–8.2)
PROTHROMBIN TIME: 14 SECONDS (ref 11.6–14.8)
RBC # BLD AUTO: 1.43 X10(6)UL
RH BLOOD TYPE: POSITIVE
RH BLOOD TYPE: POSITIVE
SARS-COV-2 RNA RESP QL NAA+PROBE: NOT DETECTED
SODIUM SERPL-SCNC: 138 MMOL/L (ref 136–145)
SODIUM SERPL-SCNC: 138 MMOL/L (ref 136–145)
TROPONIN I BLD-MCNC: <0.02 NG/ML
TROPONIN I SERPL HS-MCNC: <3 NG/L
URATE SERPL-MCNC: 6.4 MG/DL
WBC # BLD AUTO: 68 X10(3) UL (ref 4–11)

## 2025-01-16 PROCEDURE — 85027 COMPLETE CBC AUTOMATED: CPT | Performed by: PHYSICIAN ASSISTANT

## 2025-01-16 PROCEDURE — 87502 INFLUENZA DNA AMP PROBE: CPT | Performed by: EMERGENCY MEDICINE

## 2025-01-16 PROCEDURE — 85025 COMPLETE CBC W/AUTO DIFF WBC: CPT | Performed by: PHYSICIAN ASSISTANT

## 2025-01-16 PROCEDURE — 83010 ASSAY OF HAPTOGLOBIN QUANT: CPT | Performed by: EMERGENCY MEDICINE

## 2025-01-16 PROCEDURE — 84550 ASSAY OF BLOOD/URIC ACID: CPT | Performed by: EMERGENCY MEDICINE

## 2025-01-16 PROCEDURE — 86900 BLOOD TYPING SEROLOGIC ABO: CPT | Performed by: EMERGENCY MEDICINE

## 2025-01-16 PROCEDURE — 36430 TRANSFUSION BLD/BLD COMPNT: CPT

## 2025-01-16 PROCEDURE — 87502 INFLUENZA DNA AMP PROBE: CPT

## 2025-01-16 PROCEDURE — 99291 CRITICAL CARE FIRST HOUR: CPT

## 2025-01-16 PROCEDURE — 93010 ELECTROCARDIOGRAM REPORT: CPT

## 2025-01-16 PROCEDURE — 36415 COLL VENOUS BLD VENIPUNCTURE: CPT

## 2025-01-16 PROCEDURE — 85730 THROMBOPLASTIN TIME PARTIAL: CPT | Performed by: INTERNAL MEDICINE

## 2025-01-16 PROCEDURE — 85379 FIBRIN DEGRADATION QUANT: CPT | Performed by: PHYSICIAN ASSISTANT

## 2025-01-16 PROCEDURE — 85610 PROTHROMBIN TIME: CPT | Performed by: INTERNAL MEDICINE

## 2025-01-16 PROCEDURE — 93005 ELECTROCARDIOGRAM TRACING: CPT

## 2025-01-16 PROCEDURE — 86920 COMPATIBILITY TEST SPIN: CPT

## 2025-01-16 PROCEDURE — 84484 ASSAY OF TROPONIN QUANT: CPT

## 2025-01-16 PROCEDURE — 85384 FIBRINOGEN ACTIVITY: CPT | Performed by: EMERGENCY MEDICINE

## 2025-01-16 PROCEDURE — 83615 LACTATE (LD) (LDH) ENZYME: CPT | Performed by: EMERGENCY MEDICINE

## 2025-01-16 PROCEDURE — 83880 ASSAY OF NATRIURETIC PEPTIDE: CPT | Performed by: PHYSICIAN ASSISTANT

## 2025-01-16 PROCEDURE — 85007 BL SMEAR W/DIFF WBC COUNT: CPT | Performed by: PHYSICIAN ASSISTANT

## 2025-01-16 PROCEDURE — 86901 BLOOD TYPING SEROLOGIC RH(D): CPT | Performed by: EMERGENCY MEDICINE

## 2025-01-16 PROCEDURE — 71046 X-RAY EXAM CHEST 2 VIEWS: CPT | Performed by: PHYSICIAN ASSISTANT

## 2025-01-16 PROCEDURE — 86850 RBC ANTIBODY SCREEN: CPT | Performed by: EMERGENCY MEDICINE

## 2025-01-16 PROCEDURE — 80053 COMPREHEN METABOLIC PANEL: CPT | Performed by: PHYSICIAN ASSISTANT

## 2025-01-16 PROCEDURE — 84484 ASSAY OF TROPONIN QUANT: CPT | Performed by: PHYSICIAN ASSISTANT

## 2025-01-16 NOTE — ED INITIAL ASSESSMENT (HPI)
Pt reports feeling sob for the past week with low grade fevers. Pt denies cough, sore throat, congestion or n/v/d.

## 2025-01-16 NOTE — ED PROVIDER NOTES
I reviewed that chart and discussed the case.  I have examined the patient and noted    This is a 64-year-old male who presents with complaints of exertional dyspnea for the last week.  He has no associated shortness of breath denies any blood in his stool presently.  Denies any diarrhea denies any vomiting denies any chest pain denies any recent travel long trips denies any cough or fever.  Denies any dizziness lightheadedness.      General: .  Patient is in no respiratory distress at this present time.  The patient is in no respiratory distress    HEENT: Atraumatic, conjunctiva are  pale.  There is no icterus.  Oral mucosa Is wet.  No facial trauma.  The neck is supple.    LUNGS: Clear to auscultation, there is no wheezing or retraction.  No crackles.    CV: Cardiovascular is regular without murmurs or rubs.    ABD: The abdomen is soft nondistended nontender.  There is no rebound.  There is no guarding.    EXT: There is good pulses bilaterally.  There is no calf tenderness.  There is no rash noted.  There is no edema    NEURO: Alert and oriented x4.  Muscle strength and sensory exam is grossly normal.  And the patient is neurologically intact with no focal findings.        The patient was placed on monitors, IV was started, blood was drawn.  Workup was done to rule out acute coronary syndrome electrolyte imbalance anemia, pulmonary embolism was also considered.  Recent travel or long trips.  No calf pain.  The EKG shows normal sinus rhythm.  There is no acute ST elevations or ischemic findings.  The rest of the EKG including rate rhythm axis and intervals I agree with the EKG report . The rate is 80  The patient's electrolytes basic electrolytes showed no significant abnormalities, patient was typed and screened, the patient did get a rectal exam by me rectal exam shows heme-negative no active bleeding    The patient's comprehensive did not show anything acute.  The PT PTT was normal.  Troponin was  negative      The patient's CBC shows a white count of 68 hemoglobin 4.7 platelet count was 169 COVID was not detected flu was negative.  Comprehensive was normal type and screen patient the patient case was discussed with Dr. Quarles      .  I did talk to the lab person who did review the we did review the films and felt a little most of the stuff was blast.  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  I did have our talk to our lab who stated that the patient.    .  Discussed the case with our  we try attempted other facilities including [facility] there were not available [facility] said they will accept if they have a bed they are looking for a bed and [facility] said they will have a bed and they will accept also where the patient will go is to be determined but [facility] will be accepting the patient at [facility].    They I did talk to the fellow they want me to go go ahead and give or negative.  I discussed that we do not have a complete they want me to give O- blood I discussed I do not have any other blood here type and cross we only have preliminary here.  They did want me to give blood.  I talked to the patient they feel comfortable with his blood was started here.  The patient will be [facility] one [facility] the [facility] settings.              A total of 35 minutes of critical care time (exclusive of billable procedures) was administered to manage the patient's critical lab values due to his acute leukemia, anemia symptomatic dyspnea.  This involved direct patient intervention, complex decision making, and/or extensive discussions with the patient, family, and clinical staff.    I provided a substantive portion of care for this patient.  I personally performed the medical decision making for this encounter.

## 2025-01-16 NOTE — ED QUICK NOTES
MD at bedside discussing results and plan of care with patient. Patient remains alert and oriented, stable at this time. Pt verbalized understanding. Wife at bedside.

## 2025-01-16 NOTE — ED PROVIDER NOTES
Patient Seen in: [facility] In [place]      History     Chief Complaint   Patient presents with    Difficulty Breathing     Stated Complaint: Dyspnea with exertion x1 week, low grade fever    Subjective:   HPI      CHIEF COMPLAINT: Exertional shortness of breath     HISTORY OF PRESENT ILLNESS: Patient is a 64-year-old male with type 2 diabetes, hyperlipidemia, presenting for evaluation of exertional shortness of breath that started about a week ago.  Patient states he notices that when he climbs a flight of stairs.  Will take him a minute or 2 to catch his breath.  Then he can go about his day as normal.  No associated chest pain.  Denies any respiratory symptoms like congestion, sore throat or cough.  No fever or chills.  Denies any leg swelling.  Had a stress test last in 2015.  It sees Dr. Sheila Desouza for primary care.  States he is due for his annual physical exam soon.      REVIEW OF SYSTEMS:  Constitutional: no fever, no chills  Eyes: no discharge  ENT: no sore throat  Cardiovascular: no chest pain, no palpitations  Respiratory: As above  Gastrointestinal: no abdominal pain, no vomiting  Genitourinary: no hematuria  Musculoskeletal: no back pain  Skin: no rashes  Neurological: no headache     Otherwise a complete review of systems was obtained and other than the HPI was negative     The patient's medication list, past medical history and social history elements is as listed in today's nurse's notes are reviewed and agree. The patient's family history is reviewed and is noncontributory to the presenting problem, except as indicated as above.    Objective:     Past Medical History:    Arthritis    RT shoulder    Diabetes type 2, controlled (HCC)    Elevated liver enzymes    Fatty liver    Hypercholesterolemia              Past Surgical History:   Procedure Laterality Date    Colonoscopy  1 1/2 ago    wnl                Social History     Socioeconomic History    Marital status:     Tobacco Use    Smoking status: Never    Smokeless tobacco: Never   Substance and Sexual Activity    Alcohol use: Yes     Comment: 6 beer per wk    Drug use: No                  Physical Exam     ED Triage Vitals [01/16/25 1425]   /65   Pulse 84   Resp 20   Temp 97.9 °F (36.6 °C)   Temp src Temporal   SpO2 100 %   O2 Device None (Room air)       Current Vitals:   Vital Signs  BP: 112/65  Pulse: 88  Resp: 16  Temp: 98.3 °F (36.8 °C)  Temp src: Oral    Oxygen Therapy  SpO2: 100 %  O2 Device: None (Room air)        Physical Exam  Vital signs and nursing notes reviewed  General Appearance: Patient is alert and oriented x4 in no acute distress  Eyes: pupils equal and round, reactive to light. No pallor or injection, no sclera icterus  Ears: Bilateral TMs and canals clear  Throat: There is no erythema or exudates, no tonsillar hypertrophy.  no trismus or stridor. Uvula midline. No phonation changes, patient handling secretions well. Mucous membranes moist.  Respiratory: there are no retractions, lungs are clear to auscultation  Cardiovascular: regular rate and rhythm  Neurological:  Grossly intact, no deficits.  Gait normal.  Skin:  warm and dry, no rashes.  No jaundice. Brisk capillary refill  Musculoskeletal: neck is supple, no lymphadenopathy, non tender. no meningeal signs.  Extremities are symmetrical, full range of motion.  No pitting edema bilaterally.  Psychiatric: calm and cooperative      ED Course     Labs Reviewed   COMP METABOLIC PANEL (14) - Abnormal; Notable for the following components:       Result Value    Glucose 106 (*)     All other components within normal limits   CBC WITH DIFFERENTIAL WITH PLATELET - Abnormal; Notable for the following components:    WBC 68.0 (*)     RBC 1.43 (*)     HGB 4.7 (*)     HCT 13.4 (*)     Neutrophil Absolute Prelim 0.82 (*)     All other components within normal limits   PRO BETA NATRIURETIC PEPTIDE - Abnormal; Notable for the following components:    Pro-Beta  Natriuretic Peptide 315 (*)     All other components within normal limits   D-DIMER - Abnormal; Notable for the following components:    D-Dimer 1.89 (*)     All other components within normal limits   BASIC METABOLIC PANEL (8) - Abnormal; Notable for the following components:    Glucose 111 (*)     All other components within normal limits   TROPONIN I HIGH SENSITIVITY - Normal   PROTHROMBIN TIME (PT) - Normal   PTT, ACTIVATED - Normal   LDH - Normal   ISTAT TROPONIN - Normal   POCT FLU TEST - Normal    Narrative:     This assay is a rapid molecular in vitro test utilizing nucleic acid amplification of influenza A and B viral RNA.   RAPID SARS-COV-2 BY PCR - Normal   SCAN SLIDE   MANUAL DIFFERENTIAL   PATH COMMENT CBC   FIBRINOGEN ACTIVITY   URIC ACID   HAPTOGLOBIN   TYPE AND SCREEN    Narrative:     The following orders were created for panel order Type and screen.  Procedure                               Abnormality         Status                     ---------                               -----------         ------                     ABORH (Blood Type)[[id number]]                               Final result               Antibody Screen[[id number]]                                  Final result                 Please view results for these tests on the individual orders.   ABORH (BLOOD TYPE)   ANTIBODY SCREEN   PREPARE RBC   ABORH CONFIRMATION   PREPARE RBC          Rate, intervals and axes as noted on EKG Report.  Rate: 80 bpm  Rhythm: Normal Sinus Rhythm  Reading: Normal sinus rhythm, no evidence of acute ischemia    XR CHEST PA + LAT CHEST (CPT=71046)    Result Date: 1/16/2025  PROCEDURE:  XR CHEST PA + LAT CHEST (CPT=71046)  INDICATIONS:  Dyspnea with exertion x1 week, low grade fever  COMPARISON:  NING CT, CT CHEST (CPT=71250), 2/05/2018, 7:19 AM.  TECHNIQUE:  PA and lateral chest radiographs were obtained.  PATIENT STATED HISTORY: (As transcribed by Technologist)  Patient states shortness of breath when  walking up the stairs and getting out of the car x1 week. No other chest complaints. No surgeries.    FINDINGS:  LUNGS:  There is a 10 mm lung nodule seen in the right mid lung field anteriorly.  This is not obviously calcified and follow-up chest CT is recommended.  Differential considerations include benign and malignant lung lesions.  There is no other infiltrate, consolidation or pulmonary edema. CARDIAC:  Heart size and vascularity are normal. MEDIASTINUM:  There is no mediastinal widening. PLEURA:  There is no pleural effusion or pneumothorax. BONES:  Mild degenerative changes are seen in the spine.            CONCLUSION:  There is a 10 mm lung nodule seen anteriorly within the right mid lung.  Measurements on a previous chest CT from 2/5/2018 where 6 x 7 mm.  Chest CT recommended for further evaluation to more accurately characterize and measure the lesion compared to the previous CT.  Benign and malignant lung nodules are within the differential.   LOCATION:  [facility]   Dictated by (CST): Kwabena Caldwell MD on 1/16/2025 at 4:10 PM     Finalized by (CST): Kwabena Caldwell MD on 1/16/2025 at 4:16 PM          I personally reviewed the chest x-ray images.  No consolidation appreciated       Differential diagnosis ACS, new onset CHF, pleurisy, viral URI with cough, PE, anemia    MDM      This 64-year-old male presents for evaluation of 1 week of exertional shortness of breath.  EKG showed normal sinus rhythm, no evidence of ischemia.  Patient had an IV line established and blood work was performed.  CBC showed a white blood cell count of 68,000.  Hemoglobin significantly low at 4.7.  Hematocrit also low at 13.4.  Platelet count normal at 169.  CMP showed a glucose of 106, otherwise unremarkable.  Troponin negative.  D-dimer elevated at 1.89.  PT INR 14/1.10.  PTT 30.5, within normal limits.  LDH normal at 233.  ABO Rh was O+.  Antibody screen negative.  Patient was given 1 unit of O- blood.  Case was discussed  with [facility].  They recommend transfer to tertiary care center.  Case was discussed with tertiary care centers around [facility].  Many were full, unable to accept the patient.  Case discussed with [facility], who is agreeable to accepting the patient for transfer.  Patient transferred to [facility].  Patient was seen and evaluated with Dr. Hameed, who agrees with the disposition and plan.        Medical Decision Making  Amount and/or Complexity of Data Reviewed  External Data Reviewed: labs.     Details: Previous CBC from 2023 shows normal white blood cell count, hemoglobin, hematocrit and platelet count.  No CBC performed in 2024  Labs: ordered. Decision-making details documented in ED Course.  Radiology: ordered and independent interpretation performed. Decision-making details documented in ED Course.  ECG/medicine tests: ordered and independent interpretation performed. Decision-making details documented in ED Course.    Risk  Decision regarding hospitalization.        Disposition and Plan     Clinical Impression:  1. Leukemia not having achieved remission, unspecified leukemia type (HCC)         Disposition:  Transfer to another facility  1/16/2025  7:49 pm    Follow-up:  No follow-up provider specified.        Medications Prescribed:  Current Discharge Medication List              Supplementary Documentation:

## 2025-01-16 NOTE — TELEPHONE ENCOUNTER
Patient scheduled [facility] appt in [place] walk in clinic for shortness of breath.  Called patient to get more information.  He has been short of breath with exertion for 3 days.  Trouble going up the stairs.  No chest pain or pressure.  No recent URI symptoms.  No cough or fevers.  He initially thought this could be RSV.  Discussed that this is unlikely without cough or URI symptoms.    Discussed with patient this could be a life threatening emergency.  Could be cardiac or severe pulmonary issue.    Have someone take him to ED now.    Patient verbalizes understanding and will go now.

## 2025-01-16 NOTE — TELEPHONE ENCOUNTER
Antonio Bhatt Jr. requesting Medication Refill for:  atorvastatin 10 MG Oral Tab           Preferred Pharmacy: [facility] - [place]     LOV: 3/22/2024   Last Refill date: 10/15/2024  Next Scheduled appointment:

## 2025-01-16 NOTE — TELEPHONE ENCOUNTER
Antonio Bhatt Jr. requesting Medication Refill for:  lisinopril 2.5 MG Oral Tab       Preferred Pharmacy:   Replaced by [facility] - [place] ([place]       LOV: 3/22/2024   Last Refill date: 06/06/2024  Next Scheduled appointment:

## 2025-01-17 DIAGNOSIS — N40.0 BENIGN PROSTATIC HYPERPLASIA WITHOUT LOWER URINARY TRACT SYMPTOMS: ICD-10-CM

## 2025-01-17 LAB
ATRIAL RATE: 80 BPM
BASOPHILS # BLD: 0 X10(3) UL (ref 0–0.2)
BASOPHILS NFR BLD: 0 %
BLASTS # BLD: 62.56 X10(3) UL
BLASTS NFR BLD: 92 %
EOSINOPHIL # BLD: 0 X10(3) UL (ref 0–0.7)
EOSINOPHIL NFR BLD: 0 %
LYMPHOCYTES NFR BLD: 4.76 X10(3) UL (ref 1–4)
LYMPHOCYTES NFR BLD: 7 %
MONOCYTES # BLD: 0.68 X10(3) UL (ref 0.1–1)
MONOCYTES NFR BLD: 1 %
MORPHOLOGY: NORMAL
NEUTROPHILS NFR BLD: 0 %
NEUTS HYPERSEG # BLD: 0 X10(3) UL (ref 1.5–7.7)
P AXIS: 71 DEGREES
P-R INTERVAL: 174 MS
PLATELET MORPHOLOGY: NORMAL
Q-T INTERVAL: 408 MS
QRS DURATION: 86 MS
QTC CALCULATION (BEZET): 470 MS
R AXIS: 52 DEGREES
T AXIS: 59 DEGREES
TOTAL CELLS COUNTED BLD: 100
VENTRICULAR RATE: 80 BPM

## 2025-01-17 RX ORDER — TAMSULOSIN HYDROCHLORIDE 0.4 MG/1
0.4 CAPSULE ORAL DAILY
Qty: 90 CAPSULE | Refills: 1 | Status: SHIPPED | OUTPATIENT
Start: 2025-01-17

## 2025-01-17 RX ORDER — LISINOPRIL 2.5 MG/1
2.5 TABLET ORAL DAILY
Qty: 90 TABLET | Refills: 0 | Status: SHIPPED | OUTPATIENT
Start: 2025-01-17

## 2025-01-17 RX ORDER — TAMSULOSIN HYDROCHLORIDE 0.4 MG/1
0.4 CAPSULE ORAL DAILY
Qty: 90 CAPSULE | Refills: 0 | OUTPATIENT
Start: 2025-01-17

## 2025-01-17 RX ORDER — ATORVASTATIN CALCIUM 10 MG/1
10 TABLET, FILM COATED ORAL DAILY
Qty: 90 TABLET | Refills: 1 | OUTPATIENT
Start: 2025-01-17

## 2025-01-17 RX ORDER — ATORVASTATIN CALCIUM 10 MG/1
10 TABLET, FILM COATED ORAL DAILY
Qty: 90 TABLET | Refills: 0 | Status: SHIPPED | OUTPATIENT
Start: 2025-01-17

## 2025-01-17 NOTE — CM/SW NOTE
Pt accepted at [facility]. Pt will be going to their [facility], room 1462.   Rn to call report at [phone].

## 2025-01-17 NOTE — TELEPHONE ENCOUNTER
Antonio Bhatt Jr. requesting Medication Refill for:    Medication name and dose (copy and paste from medication list): lisinopril 2.5 MG Oral Tab     tamsulosin 0.4 MG Oral Cap       If medication is not on medication list - transfer patient to RN queue for triage    Preferred Pharmacy:   [facility]       LOV: 3/22/2024   Last Refill date: 6/6/24  Next Scheduled appointment:

## 2025-01-17 NOTE — ED QUICK NOTES
PRBC infusing, patient tolerating, no distress noted. Patient denies SOB, denies nausea, no adverse reactions. Patient remains alert and oriented, stable at this time.

## 2025-01-17 NOTE — ED QUICK NOTES
Patient diabetic, hyperglycemia protocol popped up for me to notify MD of pt extended order set for over 6 hr stay in ER. Dr. Hameed aware, no orders given at this time. Patient transport in process, pt stable at this time.

## 2025-01-17 NOTE — ED QUICK NOTES
Per MD, PRBC is to be transfused prior to transfer set up as requested by Faby. Lab aware and type and screen confirmation being completed. Patient remains alert and oriented, stable at this time.

## 2025-01-17 NOTE — ED QUICK NOTES
Nurse to nurse report given to SUAD Quinn at [facility]. RN aware of patient pick-up via ambulance from our facility in 10 minutes.

## 2025-01-17 NOTE — CM/SW NOTE
Transfer Out Request     Reason: Higher Level of Care  Dx: AML   Requesting Physician: Dr. Hameed    Called in by: Dr. Hameed      Transfer RN Spoke with:  Gui @ [facility] connected with Dr. Hameed. No beds but have patient info will call when bed is available  Face sheet faxed to  [phone]    Nivia @ [facility] - No beds at capacity    Kina @ [facility] - no beds    Marcela @ [facility] connected with Dr. Hameed   Face sheet faxed to [phone]    Lexi @ [facility] on complete transfer hold only open for trauma transfers

## 2025-01-17 NOTE — ED QUICK NOTES
Patient remains alert and oriented X4, denies pain or discomfort, no distress noted. VSS. Blood transfusion infusing, safety measures maintained, no adverse reactions noted.

## 2025-01-18 LAB
BLOOD TYPE BARCODE: 5100
UNIT VOLUME: 350 ML

## 2025-02-17 ENCOUNTER — TELEPHONE (OUTPATIENT)
Facility: LOCATION | Age: 65
End: 2025-02-17

## 2025-02-17 NOTE — TELEPHONE ENCOUNTER
ALISTAIR ARAUZ JR Patient  Member ID  [id number]    Date of Birth  1960-09-16    Gender  Male    Transaction Type  Outpatient Authorization    Organization  [facility]    Payer  [facility] logo     Certificate Information  Reference Number  [id number]    Status  NO ACTION REQUIRED    Message  Requested Service does not require preauthorization. We would strongly encourage you to check benefits for this service.    Member Information  Patient Name  ALISTAIR ARAUZ JR    Patient Date of Birth  1960-09-16    Patient Gender  Male    Member ID  [id number]    Relationship to Subscriber  Self    Subscriber Name  ALISTAIR ARAUZ JR    Requesting Provider     Name  ERIN DOMINIQUE    NPI  [id number]    Tax Id  [id number]    Specialty  [id number]    Provider Role  Provider    Address  [address] [place] [address]    Phone  [phone]    Fax  [phone]    Contact Name  NOE SOSA    Service Information  Service Type  2 - Surgical    [facility]    Service From - To Date  2025-03-05 - 2025-05-30    Level of Service  Elective    Diagnosis Code 1   - Encounter for screening for malignant neoplasm of colon    Procedure Code 1 (CPT/HCPCS)  81133 - DIAGNOSTIC COLONOSCOPY    Quantity  1 Units    Status  NO ACTION REQUIRED

## 2025-02-19 ENCOUNTER — TELEPHONE (OUTPATIENT)
Facility: LOCATION | Age: 65
End: 2025-02-19

## 2025-02-19 NOTE — TELEPHONE ENCOUNTER
Pt's sx 3/5/25 Colonoscopy by Dr. Pelletier @ [facility] cancelled.  Pt is currently admitted for a different medical issue.

## 2025-02-24 ENCOUNTER — PATIENT MESSAGE (OUTPATIENT)
Dept: FAMILY MEDICINE CLINIC | Facility: CLINIC | Age: 65
End: 2025-02-24

## 2025-02-24 DIAGNOSIS — E11.9 TYPE 2 DIABETES MELLITUS WITHOUT COMPLICATION, WITHOUT LONG-TERM CURRENT USE OF INSULIN (HCC): ICD-10-CM

## 2025-02-25 RX ORDER — LANCETS 33 GAUGE
1 EACH MISCELLANEOUS
Qty: 100 EACH | Refills: 3 | Status: SHIPPED | OUTPATIENT
Start: 2025-02-25 | End: 2026-02-25

## 2025-02-25 NOTE — TELEPHONE ENCOUNTER
Medication Detail    Medication Quantity Refills Start End   OneTouch Delica Lancets 33G Does not apply Misc () 100 each 3 2023   Si Lancet by Finger stick route every morning before breakfast.     Route:   Finger stick     Order #:   [id number]         LOV 3/22/24   No future appointments.    Pended refill if agreeable

## 2025-04-15 ENCOUNTER — TELEPHONE (OUTPATIENT)
Dept: FAMILY MEDICINE CLINIC | Facility: CLINIC | Age: 65
End: 2025-04-15

## 2025-04-15 DIAGNOSIS — E11.9 TYPE 2 DIABETES MELLITUS WITHOUT COMPLICATION, WITHOUT LONG-TERM CURRENT USE OF INSULIN (HCC): ICD-10-CM

## 2025-04-15 DIAGNOSIS — E11.59 HYPERTENSION ASSOCIATED WITH DIABETES (HCC): ICD-10-CM

## 2025-04-15 DIAGNOSIS — I15.2 HYPERTENSION ASSOCIATED WITH DIABETES (HCC): ICD-10-CM

## 2025-04-18 NOTE — TELEPHONE ENCOUNTER
Please review. Protocol Failed; No Protocol    No future appointments.    [employer] message sent to patient to schedule an office visit with Provider and/or  Routed to Patient  for assistance with appointment.

## 2025-04-21 RX ORDER — LISINOPRIL 2.5 MG/1
2.5 TABLET ORAL DAILY
Qty: 90 TABLET | Refills: 0 | Status: SHIPPED | OUTPATIENT
Start: 2025-04-21

## 2025-04-21 RX ORDER — ATORVASTATIN CALCIUM 10 MG/1
10 TABLET, FILM COATED ORAL DAILY
Qty: 90 TABLET | Refills: 0 | Status: SHIPPED | OUTPATIENT
Start: 2025-04-21

## 2025-04-21 RX ORDER — METFORMIN HYDROCHLORIDE 500 MG/1
1000 TABLET, EXTENDED RELEASE ORAL 2 TIMES DAILY
Qty: 360 TABLET | Refills: 0 | Status: SHIPPED | OUTPATIENT
Start: 2025-04-21

## 2025-06-04 ENCOUNTER — TELEPHONE (OUTPATIENT)
Dept: FAMILY MEDICINE CLINIC | Facility: CLINIC | Age: 65
End: 2025-06-04

## 2025-07-15 ENCOUNTER — TELEPHONE (OUTPATIENT)
Dept: FAMILY MEDICINE CLINIC | Facility: CLINIC | Age: 65
End: 2025-07-15

## 2025-07-15 DIAGNOSIS — I15.2 HYPERTENSION ASSOCIATED WITH DIABETES (HCC): ICD-10-CM

## 2025-07-15 DIAGNOSIS — E11.59 HYPERTENSION ASSOCIATED WITH DIABETES (HCC): ICD-10-CM

## 2025-07-21 RX ORDER — LISINOPRIL 2.5 MG/1
2.5 TABLET ORAL DAILY
Qty: 30 TABLET | Refills: 0 | Status: SHIPPED | OUTPATIENT
Start: 2025-07-21

## 2025-07-21 NOTE — TELEPHONE ENCOUNTER
Please review.  Protocol failed/has no protocol    Last Office Visit: 3/22/2024    [employer] message sent to patient to schedule an office visit with Primary Care Provider.   Will also route to Call Center to make a phone attempt.

## 2025-07-25 ENCOUNTER — LAB ENCOUNTER (OUTPATIENT)
Dept: LAB | Age: 65
End: 2025-07-25
Attending: EMERGENCY MEDICINE
Payer: COMMERCIAL

## 2025-07-25 ENCOUNTER — OFFICE VISIT (OUTPATIENT)
Dept: FAMILY MEDICINE CLINIC | Facility: CLINIC | Age: 65
End: 2025-07-25
Payer: COMMERCIAL

## 2025-07-25 VITALS
WEIGHT: 209 LBS | RESPIRATION RATE: 16 BRPM | HEART RATE: 83 BPM | BODY MASS INDEX: 28.31 KG/M2 | HEIGHT: 72 IN | SYSTOLIC BLOOD PRESSURE: 102 MMHG | DIASTOLIC BLOOD PRESSURE: 72 MMHG | OXYGEN SATURATION: 95 %

## 2025-07-25 DIAGNOSIS — Z00.00 LABORATORY EXAMINATION ORDERED AS PART OF A ROUTINE GENERAL MEDICAL EXAMINATION: ICD-10-CM

## 2025-07-25 DIAGNOSIS — E11.9 TYPE 2 DIABETES MELLITUS WITHOUT COMPLICATION, WITHOUT LONG-TERM CURRENT USE OF INSULIN (HCC): ICD-10-CM

## 2025-07-25 DIAGNOSIS — K14.8 TONGUE LESION: ICD-10-CM

## 2025-07-25 DIAGNOSIS — Z00.00 ENCOUNTER FOR ANNUAL PHYSICAL EXAM: Primary | ICD-10-CM

## 2025-07-25 DIAGNOSIS — C91.01 ACUTE LYMPHOBLASTIC LEUKEMIA (ALL) IN REMISSION (HCC): ICD-10-CM

## 2025-07-25 DIAGNOSIS — Z12.11 SCREENING FOR COLON CANCER: ICD-10-CM

## 2025-07-25 DIAGNOSIS — Z23 NEED FOR TDAP VACCINATION: ICD-10-CM

## 2025-07-25 LAB
CHOLEST SERPL-MCNC: 135 MG/DL (ref ?–200)
CREAT UR-SCNC: 85.3 MG/DL
FASTING PATIENT LIPID ANSWER: YES
HDLC SERPL-MCNC: 50 MG/DL (ref 40–59)
HEMOGLOBIN A1C: 5.8 % (ref 4.3–5.6)
LDLC SERPL CALC-MCNC: 67 MG/DL (ref ?–100)
MICROALBUMIN UR-MCNC: <0.3 MG/DL
NONHDLC SERPL-MCNC: 85 MG/DL (ref ?–130)
TRIGL SERPL-MCNC: 95 MG/DL (ref 30–149)
TSI SER-ACNC: 1.58 UIU/ML (ref 0.55–4.78)
VLDLC SERPL CALC-MCNC: 14 MG/DL (ref 0–30)

## 2025-07-25 PROCEDURE — 36415 COLL VENOUS BLD VENIPUNCTURE: CPT

## 2025-07-25 PROCEDURE — 82043 UR ALBUMIN QUANTITATIVE: CPT

## 2025-07-25 PROCEDURE — 82570 ASSAY OF URINE CREATININE: CPT

## 2025-07-25 PROCEDURE — 80061 LIPID PANEL: CPT

## 2025-07-25 PROCEDURE — 84443 ASSAY THYROID STIM HORMONE: CPT

## 2025-07-25 RX ORDER — INSULIN GLARGINE 100 [IU]/ML
36 INJECTION, SOLUTION SUBCUTANEOUS NIGHTLY
COMMUNITY

## 2025-07-25 RX ORDER — PONATINIB HYDROCHLORIDE 30 MG/1
15 TABLET, FILM COATED ORAL DAILY
COMMUNITY
Start: 2025-04-17

## 2025-07-25 RX ORDER — ACYCLOVIR 400 MG/1
400 TABLET ORAL 2 TIMES DAILY
COMMUNITY
Start: 2025-07-24

## 2025-07-25 RX ORDER — LEVOFLOXACIN 500 MG/1
500 TABLET, FILM COATED ORAL DAILY
COMMUNITY
Start: 2025-07-22

## 2025-07-25 RX ORDER — BLOOD SUGAR DIAGNOSTIC
STRIP MISCELLANEOUS
Qty: 100 STRIP | Refills: 1 | Status: SHIPPED | OUTPATIENT
Start: 2025-07-25 | End: 2026-07-25

## 2025-07-25 RX ORDER — FLUCONAZOLE 200 MG/1
200 TABLET ORAL DAILY
COMMUNITY

## 2025-07-25 RX ORDER — LANCETS 33 GAUGE
1 EACH MISCELLANEOUS ONCE
Qty: 100 EACH | Refills: 1 | Status: SHIPPED | OUTPATIENT
Start: 2025-07-25 | End: 2025-07-25

## 2025-07-25 NOTE — PATIENT INSTRUCTIONS
Thank you for choosing [facility]  To Do:  FOR ALISTAIR ARAUZ JR.    Follow up with surgery service for colonoscopy, Dr Delgado  Have blood tests done  Continue with all medications  Follow up in 6 months  Follow up with KATH Diehl

## 2025-07-25 NOTE — PROGRESS NOTES
Chief Complaint:   Chief Complaint   Patient presents with    Well Adult     Pt recently dx with ALL     HPI:   This is a 64 year old male who present for a yearly annual exam    WELL-MALE EXAM         1.  Age:              64   2.  Have you had any of the following problems:          a. High blood pressure                                           NO        b. Heart disease                  NO        c. Cancer                                      Recent ALL dx         d. High cholesterol                        NO   3.  Do you have any of the following problems:          a. Bothersome joint pains                     c. Change in size/firmness  of stools   NO        d. Change in size/color of a mole               NO        e. Difficulty with urine stream  strength or flow rate NO        f. Getting up frequently at night  to urinate  NO   4. Do you have a parent, brother or sister with a history of  the following:         a. Cancer of the prostate or intestine NO        b. Heart pain or heart attack before the age of 55 NO    5. Have you ever used tobacco?                  NO                6.. Exercise  TRIES   7. Do you always wear seat belts? YES   8.  If over 30 years old, have you had your cholesterol level checked in the past five years? YES   9.  Have you had a tetanus shot  in the past 10 years? YES 2014      10.  Does your house have a working       smoke detector?  YES   11. Do you have firearms at home?   NO      12. How many sexual partners have            you had in the last 12 months? 1   13. When is the last time you had a dental check-up?________ 1 weeks         8.  Please describe any concerns you have:         History of Present Illness  Antonio Bhatt Jr. is a 64 year old male with diabetes who presents for diabetes management and follow-up.    Hyperglycemia and diabetes management  - Diabetes became uncontrolled due to steroid use associated with chemotherapy, resulting in blood glucose spikes  up to 280 mg/dL during steroid administration.  - Initially managed with insulin three times daily with a sliding scale and long-acting insulin at night.  - After last hospital admission on July 8, 2025, insulin regimen was reduced to long-acting insulin only, and metformin was resumed.  - Currently taking metformin, two tablets in the morning and one at night, occasionally three in the morning for convenience.  - Has not been using long-acting insulin as blood glucose has stabilized between 100 to 125 mg/dL without it.  - Last A1c in January was 7.9%; prior A1c was 6.4%; most recent A1c is 5.8%.  - Blood glucose improved after discontinuing steroids on July 8, 2025.  - Scheduled to start another cycle of chemotherapy with steroids on August 19, which is expected to cause episodic hyperglycemia.      Dyspnea on exertion  - Experienced shortness of breath in January, leading to an emergency room visit and subsequent diagnosis.  - Dyspnea occurred with exertion, such as climbing stairs, and progressively worsened over several days prior to seeking medical attention.          Wt Readings from Last 6 Encounters:   07/25/25 209 lb (94.8 kg)   01/16/25 205 lb (93 kg)   03/22/24 211 lb (95.7 kg)   12/12/23 218 lb (98.9 kg)   12/09/22 217 lb (98.4 kg)   03/15/22 221 lb (100.2 kg)                 [facility]   Past Medical History[1]  Past Surgical History[2]  Social History:  Short Social Hx on File[3]  Family History:  Family History[4]    Allergies   Allergies[5]    Medications Ordered Prior to Encounter[6]   Counseling given: Not Answered      PHYSICAL EXAM:   /72   Pulse 83   Resp 16   Ht 6' (1.829 m)   Wt 209 lb (94.8 kg)   SpO2 95%   BMI 28.35 kg/m²  Estimated body mass index is 28.35 kg/m² as calculated from the following:    Height as of this encounter: 6' (1.829 m).    Weight as of this encounter: 209 lb (94.8 kg).     Vital signs reviewed.Appears stated age, well groomed.  GENERAL: well developed, well  nourished, well hydrated, no distress  SKIN: good skin turgor, no obvious rashes  HEENT: atraumatic, normocephalic, ears, nose and throat are clear  EYES: sclera non icteric bilateral  NECK: supple, no adenopathy, no thyromegaly  LUNGS: clear to auscultation, no RRW  CARDIO: RRR without murmur  EXTREMITIES: no cyanosis, clubbing or edema  GI:soft Nontender Normoactive BS.  No palpable masses no guarding rigidity no rebound tenderness      ASSESSMENT AND PLAN:     1. Encounter for annual physical exam    2. Laboratory examination ordered as part of a routine general medical examination  - Lipid Panel; Future  - TSH W Reflex To Free T4; Future    3. Type 2 diabetes mellitus without complication, without long-term current use of insulin (HCC)  - Microalb/Creat Ratio, Random Urine; Future  - POC Hemoglobin A1C    4. Need for Tdap vaccination  - TETANUS, DIPHTHERIA TOXOIDS AND ACELLULAR PERTUSIS VACCINE (TDAP), >7 YEARS, IM USE    5. Screening for colon cancer  - SURGERY - INTERNAL    6. Acute lymphoblastic leukemia (ALL) in remission (HCC)    7. Tongue lesion  - ENT - INTERNAL      Assessment & Plan  Type 2 Diabetes Mellitus  Type 2 Diabetes Mellitus with improved glycemic control. A1c at 5.8%. Blood glucose 100-125 mg/dL off steroids. Managed with metformin and diet.  - Continue metformin as prescribed (two tablets in the morning, one at night).  - Monitor blood glucose levels regularly.  - Consider continuous glucose monitor if desired.  - Follow diabetic diet and lifestyle modifications.  - Monitor kidney function due to metformin use.    Chemotherapy-Induced Hyperglycemia  Hyperglycemia due to steroid use during chemotherapy. Blood glucose spiked to 280 mg/dL during last steroid use on July 8th. Stabilized post-steroid.  - Monitor blood glucose levels during chemotherapy cycles, especially during steroid administration.  - Continue current diabetes management plan with metformin.    Oral Lesion  Unusual mass at the  back of the tongue, requires further evaluation.  - Refer to ENT for further evaluation of the oral lesion.        PATIENT INSTRUCTIONS:     Follow up with surgery service for colonoscopy, Dr Delgado  Have blood tests done  Continue with all medications  Follow up in 6 months      Well Man Exam  Well balanced diet recommended.    Routine exercise recommended most days during the week.  Wear sunscreen - SPF 15 or higher and reapply every 2 hours as needed.  Wear seat belts and drive safely.  Schedule regular appointments with dentist.  Schedule yearly eye exam if you wear glasses/contacts.  Yearly Flu Vaccine recommended.  Counseled on fat diet and aerobic exercise 30 minutes three times weekly.   Counseled on maintaining a healthy weight and healthy BMI  Immunizations reviewed and updated  TDAP Given today  The patient indicates understanding of these issues and agrees to the plan.  The patient is asked  to return yearly for annual preventative health exam.  Tetanus, Diptheria and Pertussis vaccine should be given every 7-10 years.  Call or come in if there are concerns regarding domestic abuse, sexually transmitted diseases, alcohol/drug addiction, depression/anxiety issues, or any further concerns.          FOLLOW UP:    6 months         [1]   Past Medical History:   ALL (acute lymphoblastic leukemia of infant) (HCC)    Arthritis    RT shoulder    Cancer (HCC)    Remission    Diabetes (HCC)    Diabetes type 2, controlled (HCC)    Elevated liver enzymes    Fatty liver    Hypercholesterolemia   [2]   Past Surgical History:  Procedure Laterality Date    Colonoscopy  1 1/2 ago    wnl   [3]   Social History  Socioeconomic History    Marital status:    Tobacco Use    Smoking status: Never    Smokeless tobacco: Never   Substance and Sexual Activity    Alcohol use: Yes     Alcohol/week: 2.0 standard drinks of alcohol     Types: 2 Cans of beer per week     Comment: 6 beer per wk    Drug use: Never   Other Topics  Concern    Caffeine Concern No    Exercise No    Seat Belt Yes    Special Diet Yes     Comment: Low carb    Stress Concern No    Weight Concern No     Social Drivers of Health     Food Insecurity: No Food Insecurity (7/25/2025)    NCSS - Food Insecurity     Worried About Running Out of Food in the Last Year: No     Ran Out of Food in the Last Year: No   Transportation Needs: No Transportation Needs (7/25/2025)    NCSS - Transportation     Lack of Transportation: No   Housing Stability: Not At Risk (7/25/2025)    NCSS - Housing/Utilities     Has Housing: Yes     Worried About Losing Housing: No     Unable to Get Utilities: No   [4]   Family History  Problem Relation Age of Onset    Diabetes Mother    [5] No Known Allergies  [6]   Current Outpatient Medications on File Prior to Visit   Medication Sig Dispense Refill    acyclovir 400 MG Oral Tab Take 1 tablet (400 mg total) by mouth 2 (two) times daily.      fluconazole 200 MG Oral Tab Take 1 tablet (200 mg total) by mouth daily.      levoFLOXacin 500 MG Oral Tab Take 1 tablet (500 mg total) by mouth daily.      ICLUSIG 30 MG Oral Tab Take 15 mg by mouth daily.      insulin glargine 100 UNIT/ML Subcutaneous Solution Inject 36 Units into the skin nightly.      LISINOPRIL 2.5 MG Oral Tab Take 1 tablet by mouth once daily 30 tablet 0    metFORMIN  MG Oral Tablet 24 Hr Take 2 tablets (1,000 mg total) by mouth 2 (two) times daily. MORNING AND EVENING 360 tablet 0    atorvastatin 10 MG Oral Tab Take 1 tablet (10 mg total) by mouth daily. 90 tablet 0    OneTouch Delica Lancets 33G Does not apply Misc 1 Lancet by Finger stick route every morning before breakfast. 100 each 3    tamsulosin 0.4 MG Oral Cap Take 1 capsule (0.4 mg total) by mouth daily. 90 capsule 1    hydrocortisone 2.5 % External Cream Apply 1 Application  topically 2 (two) times daily as needed. Do not use consecutively for more than 10- 14 days 453 g 1    PEG 3350-KCl-Na Bicarb-NaCl (TRILYTE) 420 g Oral  Recon Soln Starting at 4:00 pm the night before procedure, drink 8 ounces of the prep every 15-20 minutes until finished 1 each 0     No current facility-administered medications on file prior to visit.

## 2025-07-25 NOTE — PROGRESS NOTES
The following individual(s) verbally consented to be recorded using ambient AI listening technology and understand that they can each withdraw their consent to this listening technology at any point by asking the clinician to turn off or pause the recording:    Patient name: Antonio Bhatt Jr.  Additional names:

## 2025-07-27 PROBLEM — C91.01 ACUTE LYMPHOBLASTIC LEUKEMIA (ALL) IN REMISSION (HCC): Status: ACTIVE | Noted: 2025-07-27

## 2025-07-30 ENCOUNTER — TELEPHONE (OUTPATIENT)
Dept: FAMILY MEDICINE CLINIC | Facility: CLINIC | Age: 65
End: 2025-07-30

## 2025-07-30 DIAGNOSIS — E11.9 TYPE 2 DIABETES MELLITUS WITHOUT COMPLICATION, WITHOUT LONG-TERM CURRENT USE OF INSULIN (HCC): Primary | ICD-10-CM

## 2025-07-30 RX ORDER — BLOOD SUGAR DIAGNOSTIC
STRIP MISCELLANEOUS
Qty: 100 STRIP | Refills: 1 | Status: SHIPPED | OUTPATIENT
Start: 2025-07-30 | End: 2026-07-30

## 2025-08-05 ENCOUNTER — PATIENT MESSAGE (OUTPATIENT)
Dept: FAMILY MEDICINE CLINIC | Facility: CLINIC | Age: 65
End: 2025-08-05

## 2025-08-05 DIAGNOSIS — E11.9 TYPE 2 DIABETES MELLITUS WITHOUT COMPLICATION, WITHOUT LONG-TERM CURRENT USE OF INSULIN (HCC): Primary | ICD-10-CM

## 2025-08-07 RX ORDER — BLOOD SUGAR DIAGNOSTIC
1 STRIP MISCELLANEOUS DAILY
Qty: 100 STRIP | Refills: 1 | Status: SHIPPED | OUTPATIENT
Start: 2025-08-07

## 2025-08-07 RX ORDER — BLOOD-GLUCOSE METER
1 EACH MISCELLANEOUS DAILY
Qty: 1 KIT | Refills: 0 | Status: SHIPPED | OUTPATIENT
Start: 2025-08-07

## 2025-08-16 DIAGNOSIS — E11.59 HYPERTENSION ASSOCIATED WITH DIABETES (HCC): ICD-10-CM

## 2025-08-16 DIAGNOSIS — I15.2 HYPERTENSION ASSOCIATED WITH DIABETES (HCC): ICD-10-CM

## 2025-08-19 RX ORDER — LISINOPRIL 2.5 MG/1
2.5 TABLET ORAL DAILY
Qty: 90 TABLET | Refills: 3 | Status: SHIPPED | OUTPATIENT
Start: 2025-08-19

## (undated) DIAGNOSIS — E11.9 TYPE 2 DIABETES MELLITUS WITHOUT COMPLICATION, WITHOUT LONG-TERM CURRENT USE OF INSULIN (HCC): ICD-10-CM

## (undated) NOTE — Clinical Note
Date: 4/7/2017    Patient Name: Anabelle Nava. To Whom it may concern: The above patient was seen at the [facility] for treatment of a medical condition.     This patient should be excused from attending work from Friday 4/7/2

## (undated) NOTE — MR AVS SNAPSHOT
[address]  [address] [address]  [phone]               Thank you for choosing us for your health care visit with Sindy Junior MD.  We are glad to serve you and happy to provide you with this summary of someone who has the flu coughs, sneezes, laughs, or talks. Influenza (“the flu”) is an infection that affects your respiratory tract. This tract is made up of your mouth, nose, and lungs, and the passages between them.  Unlike a cold, the flu can make you The flu usually gets better after 7 days or so. In some cases, your healthcare provider may prescribe an antiviral medicine. This may help you get well sooner.  For the medicine to help, you need to take it as soon as possible (ideally within 48 hours) afte CDC says this is because the nasal spray did not seem to protect against the flu over the last several flu seasons. In the past, it was meant for people ages 3 to 52. · Wash your hands often. Frequent handwashing is a proven way to help prevent infection. · People with the flu have private rooms and bathrooms or share a room with someone with the same infection. · People at high-risk for the flu but don't have it are encouraged to get the flu and pneumonia vaccines.   · All healthcare workers are encouraged These medications were sent to [facility] [address], [phone]  [address]     Phone:  [phone]    - guaiFENesin-codeine 100-10 MG/5ML Soln  - Oseltamivir Phosphate 75 Get your heart pumping – brisk walking, biking, swimming Even 10 minute increments are effective and add up over the week   2 ½ hours per week – spread out over time Use a carolyn to keep you motivated   Don’t forget strength training with weights and resist

## (undated) NOTE — LETTER
24    Patient: Antonio Bhatt Jr.  : 1960 Visit date: 2024    Dear  Britt Desouza MD    Thank you for referring Antonio Bhatt JrKendra to my practice.  Please find my assessment and plan below.        I saw Pino in my office, he presents for colonoscopy.  He is average risk based on his history.  He is scheduled.  Thank you Indra  Sincerely,       Sloan Lorenz DO   CC:   No Recipients

## (undated) NOTE — LETTER
04/08/19        Siva Lovett.   [address] [facility]      Dear Sandy Grant records indicate that you have outstanding lab work and or testing that was ordered for you and has not yet been completed:  Orders Placed This Encounter

## (undated) NOTE — LETTER
08/07/18        Indra Dia.   [address] [facility]      Dear [facility] records indicate that you have outstanding lab work and or testing that was ordered for you and has not yet been completed:          CMP       Lipid       H

## (undated) NOTE — LETTER
01/10/19        [facility]   [address] [facility]      Dear Clovis Cohen records indicate that you have outstanding lab work and or testing that was ordered for you and has not yet been completed:  Orders Placed This Encounter